# Patient Record
Sex: FEMALE | Race: BLACK OR AFRICAN AMERICAN | NOT HISPANIC OR LATINO | Employment: OTHER | URBAN - NONMETROPOLITAN AREA
[De-identification: names, ages, dates, MRNs, and addresses within clinical notes are randomized per-mention and may not be internally consistent; named-entity substitution may affect disease eponyms.]

---

## 2018-06-13 ENCOUNTER — HISTORICAL (OUTPATIENT)
Dept: ADMINISTRATIVE | Facility: HOSPITAL | Age: 83
End: 2018-06-13

## 2018-06-16 ENCOUNTER — HISTORICAL (OUTPATIENT)
Dept: ADMINISTRATIVE | Facility: HOSPITAL | Age: 83
End: 2018-06-16

## 2019-02-28 ENCOUNTER — HISTORICAL (OUTPATIENT)
Dept: ADMINISTRATIVE | Facility: HOSPITAL | Age: 84
End: 2019-02-28

## 2019-04-22 ENCOUNTER — HISTORICAL (OUTPATIENT)
Dept: ADMINISTRATIVE | Facility: HOSPITAL | Age: 84
End: 2019-04-22

## 2019-05-02 ENCOUNTER — HISTORICAL (OUTPATIENT)
Dept: ADMINISTRATIVE | Facility: HOSPITAL | Age: 84
End: 2019-05-02

## 2020-02-17 ENCOUNTER — HISTORICAL (OUTPATIENT)
Dept: ADMINISTRATIVE | Facility: HOSPITAL | Age: 85
End: 2020-02-17

## 2020-07-17 LAB
BILIRUB SERPL-MCNC: NEGATIVE MG/DL
BLOOD URINE, POC: NORMAL
CLARITY, POC UA: NORMAL
COLOR, POC UA: YELLOW
GLUCOSE UR QL STRIP: NEGATIVE
KETONES UR QL STRIP: NEGATIVE
LEUKOCYTE EST, POC UA: NEGATIVE
NITRITE, POC UA: NEGATIVE
PH, POC UA: 6
PROTEIN, POC: NEGATIVE
SPECIFIC GRAVITY, POC UA: 1.02
UROBILINOGEN, POC UA: NORMAL

## 2020-07-23 LAB
BILIRUB SERPL-MCNC: NEGATIVE MG/DL
BLOOD URINE, POC: NEGATIVE
CLARITY, POC UA: CLEAR
COLOR, POC UA: YELLOW
GLUCOSE UR QL STRIP: NEGATIVE
KETONES UR QL STRIP: NEGATIVE
LEUKOCYTE EST, POC UA: NEGATIVE
NITRITE, POC UA: NEGATIVE
PH, POC UA: 6.5
PROTEIN, POC: NEGATIVE
SPECIFIC GRAVITY, POC UA: 1
UROBILINOGEN, POC UA: NORMAL

## 2020-10-20 ENCOUNTER — HISTORICAL (OUTPATIENT)
Dept: ADMINISTRATIVE | Facility: HOSPITAL | Age: 85
End: 2020-10-20

## 2020-10-26 ENCOUNTER — HISTORICAL (OUTPATIENT)
Dept: ADMINISTRATIVE | Facility: HOSPITAL | Age: 85
End: 2020-10-26

## 2021-06-19 ENCOUNTER — HISTORICAL (OUTPATIENT)
Dept: ADMINISTRATIVE | Facility: HOSPITAL | Age: 86
End: 2021-06-19

## 2021-07-14 ENCOUNTER — HISTORICAL (OUTPATIENT)
Dept: ADMINISTRATIVE | Facility: HOSPITAL | Age: 86
End: 2021-07-14

## 2021-11-29 LAB
ALBUMIN SERPL-MCNC: 4 G/DL (ref 3.4–5)
ALBUMIN/GLOB SERPL: 1.2 {RATIO}
ALP SERPL-CCNC: 94 U/L (ref 50–144)
ALT SERPL-CCNC: 15 U/L (ref 1–45)
ANION GAP SERPL CALC-SCNC: 7 MMOL/L (ref 2–13)
AST SERPL-CCNC: 25 U/L (ref 14–36)
BASOPHILS # BLD AUTO: 0.07 10*3/UL (ref 0.01–0.08)
BASOPHILS NFR BLD AUTO: 1.4 % (ref 0.1–1.2)
BILIRUB SERPL-MCNC: 0.3 MG/DL (ref 0–1)
BUN SERPL-MCNC: 14 MG/DL (ref 7–20)
CALCIUM SERPL-MCNC: 9.3 MG/DL (ref 8.4–10.2)
CHLORIDE SERPL-SCNC: 101 MMOL/L (ref 94–110)
CO2 SERPL-SCNC: 30 MMOL/L (ref 21–32)
CREAT SERPL-MCNC: 0.81 MG/DL (ref 0.52–1.04)
CREAT/UREA NIT SERPL: 17.3 (ref 12–20)
EOSINOPHIL # BLD AUTO: 0.11 10*3/UL (ref 0.04–0.36)
EOSINOPHIL NFR BLD AUTO: 2.2 % (ref 0.7–7)
ERYTHROCYTE [DISTWIDTH] IN BLOOD BY AUTOMATED COUNT: 13.2 % (ref 11–14.5)
EST CREAT CLEARANCE SER (OHS): 62.41 ML/MIN
EST. AVERAGE GLUCOSE BLD GHB EST-MCNC: 129 MG/DL (ref 70–115)
GLOBULIN SER-MCNC: 3.3 G/DL (ref 2–3.9)
GLUCOSE SERPL-MCNC: 193 MGM./DL (ref 70–115)
HBA1C MFR BLD: 6.3 % (ref 4–6)
HCT VFR BLD AUTO: 42.1 % (ref 36–48)
HGB BLD-MCNC: 13.8 G/DL (ref 11.8–16)
IMM GRANULOCYTES # BLD AUTO: 0.01 10*3/UL (ref 0–0.03)
IMM GRANULOCYTES NFR BLD AUTO: 0.2 % (ref 0–0.5)
LYMPHOCYTES # BLD AUTO: 2.02 10*3/UL (ref 1.16–3.74)
LYMPHOCYTES NFR BLD AUTO: 40.1 % (ref 20–55)
MCH RBC QN AUTO: 29.4 PG (ref 27–34)
MCHC RBC AUTO-ENTMCNC: 32.8 G/DL (ref 31–37)
MCV RBC AUTO: 89.8 FL (ref 79–99)
MONOCYTES # BLD AUTO: 0.45 10*3/UL (ref 0.24–0.36)
MONOCYTES NFR BLD AUTO: 8.9 % (ref 4.7–12.5)
NEUTROPHILS # BLD AUTO: 2.38 10*3/UL (ref 1.56–6.13)
NEUTROPHILS NFR BLD AUTO: 47.2 % (ref 37–73)
PLATELET # BLD AUTO: 279 10*3/UL (ref 140–371)
PMV BLD AUTO: 10.9 FL (ref 9.4–12.4)
POTASSIUM SERPL-SCNC: 4.2 MMOL/L (ref 3.5–5.1)
PROT SERPL-MCNC: 7.3 G/DL (ref 6.3–8.2)
RBC # BLD AUTO: 4.69 10*6/UL (ref 4–5.1)
SODIUM SERPL-SCNC: 138 MMOL/L (ref 135–145)
TSH SERPL-ACNC: 2.44 UIU/ML (ref 0.36–3.74)
WBC # SPEC AUTO: 5 10*3/UL (ref 4–11.5)

## 2022-01-24 ENCOUNTER — HISTORICAL (OUTPATIENT)
Dept: ADMINISTRATIVE | Facility: HOSPITAL | Age: 87
End: 2022-01-24

## 2022-04-10 ENCOUNTER — HISTORICAL (OUTPATIENT)
Dept: ADMINISTRATIVE | Facility: HOSPITAL | Age: 87
End: 2022-04-10

## 2022-04-19 ENCOUNTER — HISTORICAL (OUTPATIENT)
Dept: ADMINISTRATIVE | Facility: HOSPITAL | Age: 87
End: 2022-04-19

## 2022-04-26 VITALS
BODY MASS INDEX: 33.22 KG/M2 | SYSTOLIC BLOOD PRESSURE: 132 MMHG | WEIGHT: 175.94 LBS | HEIGHT: 61 IN | DIASTOLIC BLOOD PRESSURE: 70 MMHG | OXYGEN SATURATION: 96 %

## 2022-05-02 ENCOUNTER — HISTORICAL (OUTPATIENT)
Dept: ADMINISTRATIVE | Facility: HOSPITAL | Age: 87
End: 2022-05-02

## 2022-05-03 NOTE — HISTORICAL [facility]
This is a historical note converted from Ana Laura. Formatting and pictures may have been removed.  Please reference Ana Laura for original formatting and attached multimedia. Chief Complaint  left ankle pain and swelling  History of Present Illness  She is here with continued complaints of?left ankle pain.? She describes it as a dull achy pain?in the foot ankle and lower leg.? This is been present for?several months.? She seems to think it happened after she had her left knee replacement.? She has swelling in both legs?that is stable.? She does not have any symptoms in the right?lower extremity at all.? She denies fevers or chills.? And she is otherwise been active?and feeling well.  Physical Exam  Vitals & Measurements  T:?37.1? ?C (Oral)? HR:?80(Peripheral)? BP:?138/70? SpO2:?99%?  HT:?153.00?cm? WT:?80.300?kg? BMI:?34.3?  She looks well,?mental status is at her baseline,?she is alert and pleasant  She is breathing normally  Both lower extremities have pitting edema?which is similar to?the last time I saw her  There is no redness of the lower extremities  The left medial ankle?has some?firm discolored areas?of skin?that are little more tender to touch  She does have some swelling of the foot?but less so than the?leg because she has had some compression socks  She has fairly good range of motion of the foot and ankle?for her age  She is able to walk?without any assistance  Is this discolored?tender?firm area of the left medial ankle is?a relatively new problem and she feels like?it is look like that felt like that since her?knee replacement in December 2020  Assessment/Plan  1.?Ankle pain ? M25.579  ?  She tells me that her?orthopedist sent her to a foot and ankle specialist?for this?problem.? She says that?he talked about?doing an MRI?but then she?got busy doing other things?and dealing with other issues that she has not followed up with him yet.  So we will check to see if she has an MRI compatible?knee implant. ?Send  for the notes from this foot and ankle specialist.? We will check on her insurance?issues/coverage?and see if we can do this MRI?to further evaluate?her symptoms.? She also had x-rays?done at that clinic in [place]?so we will send for those reports as well. ?She requested some medication to help with her symptoms and?said the meloxicam helped her?in the past.? I will send the prescription for that and I cautioned her to only take a few days at a time?and to make sure she is well-hydrated when she takes it.  ?  Orders:  meloxicam, See Instructions, 1 tab(s) Oral Daily for up to 5 days in a row, # 30 tab(s), 1 Refill(s), Pharmacy: [id number], 153, cm, Height/Length Dosing, 06/04/21 9:44:00 CDT, 80.3, kg, Weight Dosing, 06/04/21 9:44:00 CDT  Office/Outpatient Visit Level 3 Established [id number], [facility]le [facility], 06/04/21 10:00:00 CDT   Problem List/Past Medical History  Ongoing  Benign essential hypertension  Obesity  Historical  No qualifying data  Procedure/Surgical History  Arthroplasty, knee, condyle and plateau; medial AND lateral compartments with or without patella resurfacing (total knee arthroplasty) (12/07/2020)  Appendectomy;  Arthroscopy, knee, diagnostic, with or without synovial biopsy (separate procedure)  Total abdominal hysterectomy (corpus and cervix), with or without removal of tube(s), with or without removal of ovary(s);   Medications  acetaminophen-hydrocodone 325 mg-10 mg oral tablet, 1 tab(s), Oral, q6hr  amlodipine 5 mg oral tablet, See Instructions  meloxicam 15 mg oral tablet, See Instructions, 1 refills  Allergies  iodine?(Rash)  Social History  Abuse/Neglect  No, 06/04/2021  Tobacco  Never (less than 100 in lifetime), N/A, 06/04/2021  Family History  Breast cancer: Daughter.  Gastric cancer: Maternal Grandmother.  Immunizations  Vaccine Date Status   COVID-19 mRNA, LNP-S, PF - Moderna 03/03/2021 Recorded   COVID-19 mRNA, LNP-S, PF - Moderna 02/03/2021  Recorded   influenza virus vaccine, inactivated 11/16/2009 Recorded   influenza virus vaccine, inactivated 11/19/2007 Recorded   influenza virus vaccine, inactivated 11/28/2006 Recorded   influenza virus vaccine, inactivated 12/27/2005 Recorded   influenza virus vaccine, inactivated 11/29/2004 Recorded   influenza virus vaccine, inactivated 12/02/2002 Recorded   influenza virus vaccine, inactivated 12/17/2001 Recorded   pneumococcal 23-valent vaccine 11/01/1999 Recorded   Health Maintenance  Health Maintenance  ???Pending?(in the next year)  ??? ??OverDue  ??? ? ? ?Hypertension Management-BMP due??05/06/20??and every 1??year(s)  ??? ? ? ?Influenza Vaccine due??10/01/20??and every 1??day(s)  ??? ? ? ?Advance Directive due??01/02/21??and every 1??year(s)  ??? ? ? ?Cognitive Screening due??01/02/21??and every 1??year(s)  ??? ? ? ?Functional Assessment due??01/02/21??and every 1??year(s)  ??? ??Due?  ??? ? ? ?ADL Screening due??06/04/21??and every 1??year(s)  ??? ? ? ?Bone Density Screening due??06/04/21??Variable frequency  ??? ? ? ?Depression Screening due??06/04/21??Unknown Frequency  ??? ? ? ?Hypertension Management-Education due??06/04/21??and every 1??year(s)  ??? ? ? ?Lipid Screening due??06/04/21??Unknown Frequency  ??? ? ? ?Medicare Annual Wellness Exam due??06/04/21??and every 1??year(s)  ??? ? ? ?Tetanus Vaccine due??06/04/21??and every 10??year(s)  ??? ? ? ?Zoster Vaccine due??06/04/21??Unknown Frequency  ??? ??Due In Future?  ??? ? ? ?Obesity Screening not due until??01/01/22??and every 1??year(s)  ??? ? ? ?Fall Risk Assessment not due until??01/02/22??and every 1??year(s)  ??? ? ? ?Diabetes Screening not due until??05/06/22??and every 3??year(s)  ???Satisfied?(in the past 1 year)  ??? ??Satisfied?  ??? ? ? ?Blood Pressure Screening on??06/04/21.??Satisfied by Nerissa Vazquez  ??? ? ? ?Body Mass Index Check on??06/04/21.??Satisfied by Nerissa Vazquez  ??? ? ? ?Fall Risk Assessment on??06/04/21.??Satisfied by  Nerissa Vazquez  ??? ? ? ?Hypertension Management-Blood Pressure on??06/04/21.??Satisfied by Nerissa Vazquez  ??? ? ? ?Influenza Vaccine on??01/11/21.??Satisfied by Nerissa Vazquez  ??? ? ? ?Obesity Screening on??06/04/21.??Satisfied by Nerissa Vazquez  ?

## 2022-09-18 ENCOUNTER — HISTORICAL (OUTPATIENT)
Dept: ADMINISTRATIVE | Facility: HOSPITAL | Age: 87
End: 2022-09-18

## 2022-12-01 ENCOUNTER — CLINICAL SUPPORT (OUTPATIENT)
Dept: RESPIRATORY THERAPY | Facility: HOSPITAL | Age: 87
End: 2022-12-01
Attending: ANESTHESIOLOGY
Payer: MEDICARE

## 2022-12-01 DIAGNOSIS — Z12.11 SCREEN FOR COLON CANCER: ICD-10-CM

## 2022-12-01 DIAGNOSIS — Z12.11 SCREEN FOR COLON CANCER: Primary | ICD-10-CM

## 2022-12-01 PROCEDURE — 93005 ELECTROCARDIOGRAM TRACING: CPT

## 2022-12-01 RX ORDER — AMLODIPINE BESYLATE 5 MG/1
5 TABLET ORAL EVERY MORNING
COMMUNITY
Start: 2022-11-17 | End: 2023-08-24

## 2022-12-01 NOTE — DISCHARGE INSTRUCTIONS
Follow prep on Sunday. Nothing to drink after midnight. Take Amlodipine AM of procedure with small sip of water.

## 2022-12-02 ENCOUNTER — ANESTHESIA EVENT (OUTPATIENT)
Dept: SURGERY | Facility: HOSPITAL | Age: 87
End: 2022-12-02
Payer: MEDICARE

## 2022-12-02 NOTE — ANESTHESIA PREPROCEDURE EVALUATION
12/02/2022  Pinky West is a 88 y.o., female.      Pre-op Assessment    I have reviewed the Patient Summary Reports.     I have reviewed the Nursing Notes. I have reviewed the NPO Status.   I have reviewed the Medications.     Review of Systems         Anesthesia Plan  Type of Anesthesia, risks & benefits discussed:    Anesthesia Type: Gen Natural Airway  ASA Score: 3    Ready For Surgery From Anesthesia Perspective.     .

## 2022-12-05 ENCOUNTER — HOSPITAL ENCOUNTER (OUTPATIENT)
Facility: HOSPITAL | Age: 87
Discharge: HOME | End: 2022-12-05
Attending: SURGERY | Admitting: SURGERY
Payer: MEDICARE

## 2022-12-05 ENCOUNTER — ANESTHESIA (OUTPATIENT)
Dept: SURGERY | Facility: HOSPITAL | Age: 87
End: 2022-12-05
Payer: MEDICARE

## 2022-12-05 VITALS
OXYGEN SATURATION: 100 % | WEIGHT: 172 LBS | DIASTOLIC BLOOD PRESSURE: 74 MMHG | TEMPERATURE: 97 F | SYSTOLIC BLOOD PRESSURE: 152 MMHG | BODY MASS INDEX: 32.06 KG/M2 | RESPIRATION RATE: 18 BRPM | HEART RATE: 68 BPM

## 2022-12-05 DIAGNOSIS — Z12.11 SCREEN FOR COLON CANCER: ICD-10-CM

## 2022-12-05 DIAGNOSIS — Z12.11 SCREENING FOR COLON CANCER: ICD-10-CM

## 2022-12-05 PROCEDURE — 45385 COLONOSCOPY W/LESION REMOVAL: CPT | Performed by: SURGERY

## 2022-12-05 PROCEDURE — 25000003 PHARM REV CODE 250: Performed by: NURSE ANESTHETIST, CERTIFIED REGISTERED

## 2022-12-05 PROCEDURE — 37000008 HC ANESTHESIA 1ST 15 MINUTES: Performed by: SURGERY

## 2022-12-05 PROCEDURE — 88305 TISSUE EXAM BY PATHOLOGIST: CPT | Performed by: SURGERY

## 2022-12-05 PROCEDURE — 63600175 PHARM REV CODE 636 W HCPCS: Performed by: NURSE ANESTHETIST, CERTIFIED REGISTERED

## 2022-12-05 PROCEDURE — 37000009 HC ANESTHESIA EA ADD 15 MINS: Performed by: SURGERY

## 2022-12-05 PROCEDURE — C1773 RET DEV, INSERTABLE: HCPCS | Performed by: SURGERY

## 2022-12-05 PROCEDURE — 63600175 PHARM REV CODE 636 W HCPCS: Performed by: ANESTHESIOLOGY

## 2022-12-05 RX ORDER — LIDOCAINE HYDROCHLORIDE 10 MG/ML
1 INJECTION, SOLUTION EPIDURAL; INFILTRATION; INTRACAUDAL; PERINEURAL ONCE
Status: DISCONTINUED | OUTPATIENT
Start: 2022-12-05 | End: 2022-12-05 | Stop reason: HOSPADM

## 2022-12-05 RX ORDER — FENTANYL CITRATE 50 UG/ML
INJECTION, SOLUTION INTRAMUSCULAR; INTRAVENOUS
Status: DISCONTINUED | OUTPATIENT
Start: 2022-12-05 | End: 2022-12-05

## 2022-12-05 RX ORDER — SODIUM CHLORIDE, SODIUM LACTATE, POTASSIUM CHLORIDE, CALCIUM CHLORIDE 600; 310; 30; 20 MG/100ML; MG/100ML; MG/100ML; MG/100ML
INJECTION, SOLUTION INTRAVENOUS CONTINUOUS
Status: DISCONTINUED | OUTPATIENT
Start: 2022-12-05 | End: 2022-12-05 | Stop reason: HOSPADM

## 2022-12-05 RX ORDER — LIDOCAINE HYDROCHLORIDE 20 MG/ML
INJECTION INTRAVENOUS
Status: DISCONTINUED | OUTPATIENT
Start: 2022-12-05 | End: 2022-12-05

## 2022-12-05 RX ORDER — PROPOFOL 10 MG/ML
VIAL (ML) INTRAVENOUS
Status: DISCONTINUED | OUTPATIENT
Start: 2022-12-05 | End: 2022-12-05

## 2022-12-05 RX ADMIN — FENTANYL CITRATE 50 MCG: 50 INJECTION, SOLUTION INTRAMUSCULAR; INTRAVENOUS at 08:12

## 2022-12-05 RX ADMIN — PROPOFOL 50 MG: 10 INJECTION, EMULSION INTRAVENOUS at 08:12

## 2022-12-05 RX ADMIN — FENTANYL CITRATE 25 MCG: 50 INJECTION, SOLUTION INTRAMUSCULAR; INTRAVENOUS at 08:12

## 2022-12-05 RX ADMIN — PROPOFOL 100 MG: 10 INJECTION, EMULSION INTRAVENOUS at 08:12

## 2022-12-05 RX ADMIN — LIDOCAINE HYDROCHLORIDE 60 MG: 20 INJECTION, SOLUTION INTRAVENOUS at 08:12

## 2022-12-05 RX ADMIN — SODIUM CHLORIDE, POTASSIUM CHLORIDE, SODIUM LACTATE AND CALCIUM CHLORIDE: 600; 310; 30; 20 INJECTION, SOLUTION INTRAVENOUS at 07:12

## 2022-12-05 NOTE — ANESTHESIA POSTPROCEDURE EVALUATION
Anesthesia Post Evaluation    Patient: Pinky West    Procedure(s) Performed: Procedure(s) (LRB):  COLONOSCOPY (N/A)  COLONOSCOPY, WITH POLYPECTOMY USING SNARE (N/A)  POLYPECTOMY (N/A)    Final Anesthesia Type: general      Patient participation: Yes- Able to Participate  Level of consciousness: awake and alert  Post-procedure vital signs: reviewed and stable  Pain management: adequate  Airway patency: patent    PONV status at discharge: No PONV  Anesthetic complications: no      Cardiovascular status: blood pressure returned to baseline  Respiratory status: unassisted  Hydration status: euvolemic  Follow-up not needed.          Vitals Value Taken Time   /79 12/05/22 0659   Temp 36 °C (96.8 °F) 12/05/22 0659   Pulse 77 12/05/22 0659   Resp 20 12/05/22 0659   SpO2 98 % 12/05/22 0659         No case tracking events are documented in the log.      Pain/Zonia Score: No data recorded

## 2022-12-05 NOTE — OP NOTE
[facility]                     [address] [address]    PATIENT NAME:      JAIME BAKER   YOB: 1934  CSN:               [id number]  MRN:               [id number]  ADMIT DATE:        12/05/2022 06:49:00  PHYSICIAN:         Alfreda Hayes MD                          OPERATIVE REPORT      DATE OF SURGERY:    12/05/2022 00:00:00    SURGEON:  Alfreda Hayes MD    PREOPERATIVE DIAGNOSES:    1. Change of bowel habits with recent constipation noted.    2. History of perforated small bowel recently.   3. Hypertension.   4. Dizziness.    POSTOPERATIVE DIAGNOSES:    1. Change of bowel habits with recent constipation noted.    2. History of perforated small bowel recently.   3. Hypertension.   4. Dizziness.    PROCEDURES:    1. Colonoscopy with intubation of ileocecal valve.    2. Polypectomy.    ASSIST:  OR staff.    COMPLICATIONS:  None.    FINDINGS:    1. Digital rectal exam with grade 3 prolapsed internal hemorrhoids without   complication.  2. Prep good to fair more proximally with some thicker stool lining the walls   along the cecum and ascending colon.  3. Appendicolith in the appendiceal orifice.  Ileocecal valve normal-appearing   with intubation of the terminal ileum for about 5 cm.  4. Normal ascending colon with a 4 mm sessile polyp retrieved using hot snare.   5. Normal transverse and descending colon with 2 mm polyp at 50 cm, retrieved   with cold forceps.    6. Normal sigmoid and rectum with retroflexion demonstrating hemorrhoidal   bundles in appropriate position.    OPERATIVE REPORT:   Patient was brought to the endoscopy suite and placed in   left lateral decubitus position.  Digital rectal exam was performed with   findings as noted above.  The scope was advanced through the anorectum into the   colon, up to the cecum.  The appendiceal orifice and ileocecal valve were both   identified.  The  appendiceal orifice had an appendicolith at the base.    Otherwise, the ileocecal valve was normal-appearing.  Intubation of the terminal   ileum demonstrated normal terminal ileum for about 5 cm.  The cecum was   otherwise normal.  The ascending colon had a 4 mm polyp that was sessile in   nature.  This was retrieved with a hot snare.  The transverse colon was normal.    The descending colon had a 2 mm polyp that was sessile in nature.  This was   retrieved with cold forceps.  Otherwise, the sigmoid colon and rectum were both   normal.  Retroflexion demonstrated hemorrhoidal bundles in appropriate position.    The scope was retrieved.  She tolerated the procedure well without   complication.        ______________________________  MD JOHNNA Felix/AQS  DD:  12/05/2022  Time:  08:45AM  DT:  12/05/2022  Time:  02:33PM  Job #:  [id number]      OPERATIVE REPORT

## 2022-12-07 LAB
DHEA SERPL-MCNC: NORMAL
ESTROGEN SERPL-MCNC: NORMAL PG/ML
INSULIN SERPL-ACNC: NORMAL U[IU]/ML
LAB AP CLINICAL INFORMATION: NORMAL
LAB AP GROSS DESCRIPTION: NORMAL
LAB AP REPORT FOOTNOTES: NORMAL
T3RU NFR SERPL: NORMAL %

## 2022-12-19 NOTE — DISCHARGE SUMMARY
[facility]  Discharge Note  Short Stay    Procedure(s) (LRB):  COLONOSCOPY (N/A)  COLONOSCOPY, WITH POLYPECTOMY USING SNARE (N/A)  POLYPECTOMY (N/A)      OUTCOME: Patient tolerated treatment/procedure well without complication and is now ready for discharge.    DISPOSITION: Home or Self Care    FINAL DIAGNOSIS:  <principal problem not specified>    FOLLOWUP: In clinic    DISCHARGE INSTRUCTIONS:  No discharge procedures on file.      Clinical Reference Documents Added to Patient Instructions         Document    COLON POLYPECTOMY DISCHARGE INSTRUCTIONS (ENGLISH)            TIME SPENT ON DISCHARGE: 3 minutes

## 2023-01-11 DIAGNOSIS — K63.1 PERFORATION OF INTESTINE: Primary | ICD-10-CM

## 2023-01-23 ENCOUNTER — HOSPITAL ENCOUNTER (OUTPATIENT)
Dept: RADIOLOGY | Facility: HOSPITAL | Age: 88
Discharge: HOME | End: 2023-01-23
Attending: SURGERY
Payer: MEDICARE

## 2023-01-23 DIAGNOSIS — K63.1 PERFORATION OF INTESTINE: ICD-10-CM

## 2023-01-23 PROCEDURE — 74176 CT ABD & PELVIS W/O CONTRAST: CPT | Mod: TC

## 2023-01-31 ENCOUNTER — TELEPHONE (OUTPATIENT)
Dept: FAMILY MEDICINE | Facility: CLINIC | Age: 88
End: 2023-01-31
Payer: MEDICARE

## 2023-01-31 NOTE — TELEPHONE ENCOUNTER
----- Message from Bozena Burgos sent at 1/31/2023 11:49 AM CST -----  Regarding: call back  Patient is requesting an appointment. She states that she is not sure if Dr. Sanchez knows what is going on with her. Please call [phone]

## 2023-01-31 NOTE — TELEPHONE ENCOUNTER
Pt was referred to Dr. Ramirez for colonoscopy at her last visit with you.  She said Dr. Ramirez referred her to his sister.  She had scope then they ordered a CT.  Both results are in the system.  She said she wanted you to know what was going on because she didn't think you did.  She has not gotten results of any of it from Dr. Hayes so she asked if you could tell her if she needs to do anything from here.

## 2023-02-01 NOTE — TELEPHONE ENCOUNTER
Let her know the colonoscopy showed one small polyp that was benign and the CT scan did not show any problem

## 2023-04-17 ENCOUNTER — TELEPHONE (OUTPATIENT)
Dept: FAMILY MEDICINE | Facility: CLINIC | Age: 88
End: 2023-04-17
Payer: MEDICARE

## 2023-04-17 NOTE — TELEPHONE ENCOUNTER
----- Message from Beti Walker sent at 4/17/2023  4:09 PM CDT -----  Regarding: call back  Pt. Called and stated that her allergies are really bad, pt. Is requesting for something to be called out.    [phone]

## 2023-04-17 NOTE — TELEPHONE ENCOUNTER
Spoke to pt, she recently had an allergic reaction to iodine and broke out in a rash.  She said that she went to Urgent care and they gave her steroids.  She wanted to know if there is something she can take instead of benadryl.  She will try claritin daily and let us know if it is not helping.

## 2023-07-25 ENCOUNTER — TELEPHONE (OUTPATIENT)
Dept: FAMILY MEDICINE | Facility: CLINIC | Age: 88
End: 2023-07-25
Payer: MEDICARE

## 2023-07-26 ENCOUNTER — OFFICE VISIT (OUTPATIENT)
Dept: FAMILY MEDICINE | Facility: CLINIC | Age: 88
End: 2023-07-26
Payer: MEDICARE

## 2023-07-26 VITALS
HEIGHT: 61 IN | BODY MASS INDEX: 33.73 KG/M2 | OXYGEN SATURATION: 97 % | WEIGHT: 178.63 LBS | SYSTOLIC BLOOD PRESSURE: 144 MMHG | TEMPERATURE: 98 F | HEART RATE: 107 BPM | DIASTOLIC BLOOD PRESSURE: 72 MMHG

## 2023-07-26 DIAGNOSIS — B37.9 CANDIDIASIS: Primary | ICD-10-CM

## 2023-07-26 PROCEDURE — 99212 OFFICE O/P EST SF 10 MIN: CPT | Mod: ,,, | Performed by: PEDIATRICS

## 2023-07-26 PROCEDURE — 99212 PR OFFICE/OUTPT VISIT, EST, LEVL II, 10-19 MIN: ICD-10-PCS | Mod: ,,, | Performed by: PEDIATRICS

## 2023-07-26 RX ORDER — FLUCONAZOLE 100 MG/1
100 TABLET ORAL DAILY
Qty: 10 TABLET | Refills: 0 | Status: SHIPPED | OUTPATIENT
Start: 2023-07-26 | End: 2023-08-05

## 2023-08-23 DIAGNOSIS — I10 PRIMARY HYPERTENSION: Primary | ICD-10-CM

## 2023-08-24 RX ORDER — AMLODIPINE BESYLATE 5 MG/1
5 TABLET ORAL
Qty: 90 TABLET | Refills: 0 | Status: SHIPPED | OUTPATIENT
Start: 2023-08-24 | End: 2023-11-15

## 2023-08-28 ENCOUNTER — TELEPHONE (OUTPATIENT)
Dept: FAMILY MEDICINE | Facility: CLINIC | Age: 88
End: 2023-08-28
Payer: MEDICARE

## 2023-08-28 NOTE — TELEPHONE ENCOUNTER
"Pt states that she tested + for COVID Saturday. She was told to contact PCP.       Denies any trouble breathing. Just feels terrible. Denies fever.       Denies any other covid symptoms besides "feeling terrible".     Please advise.     [phone]  "
Left detailed message for pt  
Several members of her [employer] had covid.  She took a test Saturday and it was positive.  She feels okay for the most part, just drowsy and light headache.  She said she doesn't feel bad- but doesn't feel good.  She said she is worried about the virus affecting her.  I told her to take tylenol/motrin as needed and drink extra fluids.    Sulfa- Iodine  
No

## 2023-10-26 ENCOUNTER — TELEPHONE (OUTPATIENT)
Dept: FAMILY MEDICINE | Facility: CLINIC | Age: 88
End: 2023-10-26
Payer: MEDICARE

## 2023-10-26 DIAGNOSIS — K92.1 BLACK STOOLS: Primary | ICD-10-CM

## 2023-10-26 PROCEDURE — 85025 COMPLETE CBC W/AUTO DIFF WBC: CPT | Performed by: PEDIATRICS

## 2023-10-26 RX ORDER — OMEPRAZOLE 20 MG/1
20 CAPSULE, DELAYED RELEASE ORAL DAILY
Qty: 30 CAPSULE | Refills: 0 | Status: SHIPPED | OUTPATIENT
Start: 2023-10-26 | End: 2024-02-27 | Stop reason: SDUPTHER

## 2023-10-26 NOTE — TELEPHONE ENCOUNTER
Pt stated on Saturday she had started having abdominal pain.  She took a dose of pepto.  Sunday she had loose black stools.  She has continued to have abdominal discomfort and darker stools- they are not as black as Sunday.  She said her stomach feels sick, no nausea or vomiting.  Dr. Sanchez said he wants her to avoid NSAIDS, take an acid blocker for one month and to come in for CBC and Occult stool.

## 2023-10-27 ENCOUNTER — TELEPHONE (OUTPATIENT)
Dept: FAMILY MEDICINE | Facility: CLINIC | Age: 88
End: 2023-10-27
Payer: MEDICARE

## 2023-11-01 ENCOUNTER — TELEPHONE (OUTPATIENT)
Dept: FAMILY MEDICINE | Facility: CLINIC | Age: 88
End: 2023-11-01
Payer: MEDICARE

## 2023-11-01 NOTE — TELEPHONE ENCOUNTER
There was an error with her occult stool cards.  They were activated prior to her dropping them off.  She is no longer having abdominal pain or discolored stools.  Dr. Sanchez said since her symptoms have resolved she does not need to recollect.  If she starts having symptoms again she will let us know.

## 2023-11-15 DIAGNOSIS — I10 PRIMARY HYPERTENSION: ICD-10-CM

## 2023-11-15 RX ORDER — AMLODIPINE BESYLATE 5 MG/1
5 TABLET ORAL
Qty: 90 TABLET | Refills: 0 | Status: SHIPPED | OUTPATIENT
Start: 2023-11-15

## 2024-01-06 ENCOUNTER — HOSPITAL ENCOUNTER (EMERGENCY)
Facility: HOSPITAL | Age: 89
Discharge: HOME | End: 2024-01-06
Attending: FAMILY MEDICINE
Payer: MEDICARE

## 2024-01-06 DIAGNOSIS — R53.83 MALAISE AND FATIGUE: Primary | ICD-10-CM

## 2024-01-06 DIAGNOSIS — R53.81 MALAISE AND FATIGUE: Primary | ICD-10-CM

## 2024-01-06 DIAGNOSIS — I10 HYPERTENSION, UNSPECIFIED TYPE: ICD-10-CM

## 2024-01-06 DIAGNOSIS — R53.1 WEAK: ICD-10-CM

## 2024-01-06 LAB
ALBUMIN SERPL-MCNC: 3.8 G/DL (ref 3.4–5)
ALBUMIN SERPL-MCNC: 4 G/DL (ref 3.4–5)
ALBUMIN/GLOB SERPL: 1.2 RATIO
ALBUMIN/GLOB SERPL: 1.2 RATIO
ALP SERPL-CCNC: 86 UNIT/L (ref 50–144)
ALP SERPL-CCNC: 94 UNIT/L (ref 50–144)
ALT SERPL-CCNC: 16 UNIT/L (ref 1–45)
ALT SERPL-CCNC: 17 UNIT/L (ref 1–45)
ANION GAP SERPL CALC-SCNC: 4 MEQ/L (ref 2–13)
ANION GAP SERPL CALC-SCNC: 5 MEQ/L (ref 2–13)
APPEARANCE UR: CLEAR
AST SERPL-CCNC: 28 UNIT/L (ref 14–36)
AST SERPL-CCNC: 30 UNIT/L (ref 14–36)
BASOPHILS # BLD AUTO: 0.04 X10(3)/MCL (ref 0.01–0.08)
BASOPHILS # BLD AUTO: 0.04 X10(3)/MCL (ref 0.01–0.08)
BASOPHILS NFR BLD AUTO: 0.6 % (ref 0.1–1.2)
BASOPHILS NFR BLD AUTO: 1 % (ref 0.1–1.2)
BILIRUB SERPL-MCNC: 0.4 MG/DL (ref 0–1)
BILIRUB SERPL-MCNC: 0.4 MG/DL (ref 0–1)
BILIRUB UR QL STRIP.AUTO: NEGATIVE
BUN SERPL-MCNC: 9 MG/DL (ref 7–20)
BUN SERPL-MCNC: 9 MG/DL (ref 7–20)
CALCIUM SERPL-MCNC: 8.6 MG/DL (ref 8.4–10.2)
CALCIUM SERPL-MCNC: 8.9 MG/DL (ref 8.4–10.2)
CHLORIDE SERPL-SCNC: 103 MMOL/L (ref 98–110)
CHLORIDE SERPL-SCNC: 105 MMOL/L (ref 98–110)
CK MB SERPL-MCNC: 0.69 NG/ML (ref 0–3.38)
CK SERPL-CCNC: 88 U/L (ref 30–135)
CO2 SERPL-SCNC: 28 MMOL/L (ref 21–32)
CO2 SERPL-SCNC: 29 MMOL/L (ref 21–32)
COLOR UR AUTO: YELLOW
CREAT SERPL-MCNC: 0.61 MG/DL (ref 0.66–1.25)
CREAT SERPL-MCNC: 0.64 MG/DL (ref 0.66–1.25)
CREAT/UREA NIT SERPL: 14 (ref 12–20)
CREAT/UREA NIT SERPL: 15 (ref 12–20)
EOSINOPHIL # BLD AUTO: 0.02 X10(3)/MCL (ref 0.04–0.36)
EOSINOPHIL # BLD AUTO: 0.08 X10(3)/MCL (ref 0.04–0.36)
EOSINOPHIL NFR BLD AUTO: 0.3 % (ref 0.7–7)
EOSINOPHIL NFR BLD AUTO: 2 % (ref 0.7–7)
ERYTHROCYTE [DISTWIDTH] IN BLOOD BY AUTOMATED COUNT: 13.2 % (ref 11–14.5)
ERYTHROCYTE [DISTWIDTH] IN BLOOD BY AUTOMATED COUNT: 13.4 % (ref 11–14.5)
GFR SERPLBLD CREATININE-BSD FMLA CKD-EPI: 85 MLS/MIN/1.73/M2
GFR SERPLBLD CREATININE-BSD FMLA CKD-EPI: 86 MLS/MIN/1.73/M2
GLOBULIN SER-MCNC: 3.1 GM/DL (ref 2–3.9)
GLOBULIN SER-MCNC: 3.3 GM/DL (ref 2–3.9)
GLUCOSE SERPL-MCNC: 131 MG/DL (ref 70–115)
GLUCOSE SERPL-MCNC: 139 MG/DL (ref 70–115)
GLUCOSE UR QL STRIP.AUTO: NEGATIVE
HCT VFR BLD AUTO: 38.5 % (ref 36–48)
HCT VFR BLD AUTO: 43.7 % (ref 36–48)
HGB BLD-MCNC: 13.1 G/DL (ref 11.8–16)
HGB BLD-MCNC: 14.8 G/DL (ref 11.8–16)
IMM GRANULOCYTES # BLD AUTO: 0 X10(3)/MCL (ref 0–0.03)
IMM GRANULOCYTES # BLD AUTO: 0.01 X10(3)/MCL (ref 0–0.03)
IMM GRANULOCYTES NFR BLD AUTO: 0 % (ref 0–0.5)
IMM GRANULOCYTES NFR BLD AUTO: 0.1 % (ref 0–0.5)
KETONES UR QL STRIP.AUTO: NEGATIVE
LACTATE SERPL-SCNC: 1.3 MMOL/L (ref 0.4–2)
LEUKOCYTE ESTERASE UR QL STRIP.AUTO: NEGATIVE
LYMPHOCYTES # BLD AUTO: 1.57 X10(3)/MCL (ref 1.16–3.74)
LYMPHOCYTES # BLD AUTO: 1.86 X10(3)/MCL (ref 1.16–3.74)
LYMPHOCYTES NFR BLD AUTO: 22.6 % (ref 20–55)
LYMPHOCYTES NFR BLD AUTO: 45.6 % (ref 20–55)
MAGNESIUM SERPL-MCNC: 2.1 MG/DL (ref 1.8–2.4)
MCH RBC QN AUTO: 30.2 PG (ref 27–34)
MCH RBC QN AUTO: 30.3 PG (ref 27–34)
MCHC RBC AUTO-ENTMCNC: 33.9 G/DL (ref 31–37)
MCHC RBC AUTO-ENTMCNC: 34 G/DL (ref 31–37)
MCV RBC AUTO: 88.7 FL (ref 79–99)
MCV RBC AUTO: 89.4 FL (ref 79–99)
MONOCYTES # BLD AUTO: 0.38 X10(3)/MCL (ref 0.24–0.36)
MONOCYTES # BLD AUTO: 0.51 X10(3)/MCL (ref 0.24–0.36)
MONOCYTES NFR BLD AUTO: 7.3 % (ref 4.7–12.5)
MONOCYTES NFR BLD AUTO: 9.3 % (ref 4.7–12.5)
NEUTROPHILS # BLD AUTO: 1.72 X10(3)/MCL (ref 1.56–6.13)
NEUTROPHILS # BLD AUTO: 4.8 X10(3)/MCL (ref 1.56–6.13)
NEUTROPHILS NFR BLD AUTO: 42.1 % (ref 37–73)
NEUTROPHILS NFR BLD AUTO: 69.1 % (ref 37–73)
NITRITE UR QL STRIP.AUTO: NEGATIVE
NRBC BLD AUTO-RTO: 0 %
NRBC BLD AUTO-RTO: 0 %
PH UR STRIP.AUTO: 7.5 [PH]
PLATELET # BLD AUTO: 233 X10(3)/MCL (ref 140–371)
PLATELET # BLD AUTO: 249 X10(3)/MCL (ref 140–371)
PMV BLD AUTO: 9.7 FL (ref 9.4–12.4)
PMV BLD AUTO: 9.8 FL (ref 9.4–12.4)
POTASSIUM SERPL-SCNC: 3.9 MMOL/L (ref 3.5–5.1)
POTASSIUM SERPL-SCNC: 4.2 MMOL/L (ref 3.5–5.1)
PROT SERPL-MCNC: 6.9 GM/DL (ref 6.3–8.2)
PROT SERPL-MCNC: 7.3 GM/DL (ref 6.3–8.2)
PROT UR QL STRIP.AUTO: NEGATIVE
RBC # BLD AUTO: 4.34 X10(6)/MCL (ref 4–5.1)
RBC # BLD AUTO: 4.89 X10(6)/MCL (ref 4–5.1)
RBC UR QL AUTO: NEGATIVE
SODIUM SERPL-SCNC: 137 MMOL/L (ref 135–145)
SODIUM SERPL-SCNC: 137 MMOL/L (ref 135–145)
SP GR UR STRIP.AUTO: 1.01 (ref 1–1.03)
TROPONIN I SERPL-MCNC: <0.012 NG/ML (ref 0–0.03)
TROPONIN I SERPL-MCNC: <0.012 NG/ML (ref 0–0.03)
TSH SERPL-ACNC: 1.81 UIU/ML (ref 0.36–3.74)
UROBILINOGEN UR STRIP-ACNC: 0.2
WBC # SPEC AUTO: 4.08 X10(3)/MCL (ref 4–11.5)
WBC # SPEC AUTO: 6.95 X10(3)/MCL (ref 4–11.5)

## 2024-01-06 PROCEDURE — 96374 THER/PROPH/DIAG INJ IV PUSH: CPT

## 2024-01-06 PROCEDURE — 84484 ASSAY OF TROPONIN QUANT: CPT | Performed by: FAMILY MEDICINE

## 2024-01-06 PROCEDURE — 80053 COMPREHEN METABOLIC PANEL: CPT | Performed by: FAMILY MEDICINE

## 2024-01-06 PROCEDURE — 96375 TX/PRO/DX INJ NEW DRUG ADDON: CPT

## 2024-01-06 PROCEDURE — 82553 CREATINE MB FRACTION: CPT | Performed by: FAMILY MEDICINE

## 2024-01-06 PROCEDURE — 83735 ASSAY OF MAGNESIUM: CPT

## 2024-01-06 PROCEDURE — 93005 ELECTROCARDIOGRAM TRACING: CPT

## 2024-01-06 PROCEDURE — 63600175 PHARM REV CODE 636 W HCPCS

## 2024-01-06 PROCEDURE — 85025 COMPLETE CBC W/AUTO DIFF WBC: CPT

## 2024-01-06 PROCEDURE — 81003 URINALYSIS AUTO W/O SCOPE: CPT

## 2024-01-06 PROCEDURE — 85025 COMPLETE CBC W/AUTO DIFF WBC: CPT | Performed by: FAMILY MEDICINE

## 2024-01-06 PROCEDURE — 63600175 PHARM REV CODE 636 W HCPCS: Performed by: FAMILY MEDICINE

## 2024-01-06 PROCEDURE — 80053 COMPREHEN METABOLIC PANEL: CPT

## 2024-01-06 PROCEDURE — 25000003 PHARM REV CODE 250: Performed by: FAMILY MEDICINE

## 2024-01-06 PROCEDURE — 83605 ASSAY OF LACTIC ACID: CPT

## 2024-01-06 PROCEDURE — 96361 HYDRATE IV INFUSION ADD-ON: CPT

## 2024-01-06 PROCEDURE — 84484 ASSAY OF TROPONIN QUANT: CPT

## 2024-01-06 PROCEDURE — 82550 ASSAY OF CK (CPK): CPT | Performed by: FAMILY MEDICINE

## 2024-01-06 PROCEDURE — 25000003 PHARM REV CODE 250

## 2024-01-06 PROCEDURE — 93010 ELECTROCARDIOGRAM REPORT: CPT | Mod: ,,, | Performed by: INTERNAL MEDICINE

## 2024-01-06 PROCEDURE — 99284 EMERGENCY DEPT VISIT MOD MDM: CPT

## 2024-01-06 PROCEDURE — 84443 ASSAY THYROID STIM HORMONE: CPT

## 2024-01-06 RX ORDER — ONDANSETRON 4 MG/1
4 TABLET, ORALLY DISINTEGRATING ORAL
Status: COMPLETED | OUTPATIENT
Start: 2024-01-06 | End: 2024-01-06

## 2024-01-06 RX ORDER — CLONIDINE HYDROCHLORIDE 0.1 MG/1
0.1 TABLET ORAL
Status: COMPLETED | OUTPATIENT
Start: 2024-01-06 | End: 2024-01-06

## 2024-01-06 RX ORDER — SODIUM CHLORIDE 9 MG/ML
1000 INJECTION, SOLUTION INTRAVENOUS
Status: DISCONTINUED | OUTPATIENT
Start: 2024-01-06 | End: 2024-01-07 | Stop reason: HOSPADM

## 2024-01-06 RX ORDER — LORAZEPAM 2 MG/ML
0.5 INJECTION INTRAMUSCULAR
Status: COMPLETED | OUTPATIENT
Start: 2024-01-06 | End: 2024-01-06

## 2024-01-06 RX ORDER — HYDRALAZINE HYDROCHLORIDE 20 MG/ML
20 INJECTION INTRAMUSCULAR; INTRAVENOUS
Status: COMPLETED | OUTPATIENT
Start: 2024-01-06 | End: 2024-01-06

## 2024-01-06 RX ADMIN — CLONIDINE HYDROCHLORIDE 0.1 MG: 0.1 TABLET ORAL at 04:01

## 2024-01-06 RX ADMIN — HYDRALAZINE HYDROCHLORIDE 20 MG: 20 INJECTION INTRAMUSCULAR; INTRAVENOUS at 04:01

## 2024-01-06 RX ADMIN — LORAZEPAM 0.5 MG: 2 INJECTION INTRAMUSCULAR; INTRAVENOUS at 07:01

## 2024-01-06 RX ADMIN — SODIUM CHLORIDE 500 ML: 9 INJECTION, SOLUTION INTRAVENOUS at 05:01

## 2024-01-06 RX ADMIN — ONDANSETRON 4 MG: 4 TABLET, ORALLY DISINTEGRATING ORAL at 05:01

## 2024-01-06 NOTE — ED PROVIDER NOTES
"Encounter Date: 1/6/2024       History     Chief Complaint   Patient presents with    Dizziness    Weakness    Hypertension     C/o dizziness, weakness, hypertension, pt reports taking amlodipene 5mg today but had missed a couple days     Patient presents to the emergency room being dizzy and weak today.  She has a long history of hypertension and is poorly compliant.  She denies any chest pains or difficulty breathing, mostly she feels tired.  She had not taken her amlodipine in "awhile", so did take 5 mg amlodipine before coming to the emergency room.    The history is provided by the patient and a relative.     Review of patient's allergies indicates:   Allergen Reactions    Sucralfate Hives    Iodine Hives and Rash     Past Medical History:   Diagnosis Date    Blood transfusion declined because patient is [employer]     HTN (hypertension)      Past Surgical History:   Procedure Laterality Date    APPENDECTOMY      COLONOSCOPY N/A 12/5/2022    Procedure: COLONOSCOPY;  Surgeon: BRAXTON Ruby MD;  Location: [facility];  Service: Endoscopy;  Laterality: N/A;    HYSTERECTOMY      KNEE SURGERY Left      Family History   Problem Relation Age of Onset    No Known Problems Mother     No Known Problems Father      Social History     Tobacco Use    Smoking status: Never    Smokeless tobacco: Never   Substance Use Topics    Alcohol use: Not Currently    Drug use: Never     Review of Systems   Constitutional:  Positive for fatigue. Negative for fever.   HENT:  Negative for sore throat.    Respiratory:  Negative for shortness of breath.    Cardiovascular:  Negative for chest pain.   Gastrointestinal:  Negative for nausea.   Genitourinary:  Negative for dysuria.   Musculoskeletal:  Negative for back pain.   Skin:  Negative for rash.   Neurological:  Negative for weakness.   Hematological:  Does not bruise/bleed easily.   All other systems reviewed and are negative.      Physical Exam     Initial Vitals " [01/06/24 1550]   BP Pulse Resp Temp SpO2   (!) 194/106 77 20 98 °F (36.7 °C) 99 %      MAP       --         Physical Exam    Nursing note and vitals reviewed.  Constitutional: She appears well-developed and well-nourished.   HENT:   Head: Normocephalic and atraumatic.   Eyes: EOM are normal. Pupils are equal, round, and reactive to light.   Neck: Neck supple.   Normal range of motion.  Cardiovascular:  Normal rate, regular rhythm and normal heart sounds.           Pulmonary/Chest: Breath sounds normal.   Abdominal: Abdomen is soft. Bowel sounds are normal. There is no abdominal tenderness.   Musculoskeletal:         General: Edema (1+ pitting edema ble) present. Normal range of motion.      Cervical back: Normal range of motion and neck supple.     Neurological: She is alert and oriented to person, place, and time.   Skin: Skin is warm and dry. Capillary refill takes less than 2 seconds.   Psychiatric: She has a normal mood and affect.         ED Course   Procedures  Labs Reviewed   COMPREHENSIVE METABOLIC PANEL - Abnormal; Notable for the following components:       Result Value    Glucose Level 131 (*)     Creatinine 0.61 (*)     All other components within normal limits   CBC WITH DIFFERENTIAL - Abnormal; Notable for the following components:    Mono # 0.38 (*)     IG# 0.00 (*)     All other components within normal limits   COMPREHENSIVE METABOLIC PANEL - Abnormal; Notable for the following components:    Glucose Level 139 (*)     Creatinine 0.64 (*)     All other components within normal limits   CBC WITH DIFFERENTIAL - Abnormal; Notable for the following components:    Eos % 0.3 (*)     Mono # 0.51 (*)     Eos # 0.02 (*)     All other components within normal limits   TROPONIN I - Normal   CK - Normal   CK-MB - Normal   URINALYSIS, REFLEX TO URINE CULTURE - Normal    Narrative:      URINE STABILITY IS 2 HOURS AT ROOM TEMP OR    SIX HOURS REFRIGERATED. PERFORMING TESTING ON    SPECIMENS GREATER THAN THIS AGE  MAY AFFECT THE    FOLLOWING TESTS:    PH          SPECIFIC GRAVITY           BLOOD    CLARITY     BILIRUBIN               UROBILINOGEN   MAGNESIUM - Normal   TSH - Normal   LACTIC ACID, PLASMA - Normal   TROPONIN I - Normal   CBC W/ AUTO DIFFERENTIAL    Narrative:     The following orders were created for panel order CBC Auto Differential.  Procedure                               Abnormality         Status                     ---------                               -----------         ------                     CBC with Differential[[id number]]       Abnormal            Final result                 Please view results for these tests on the individual orders.   CBC W/ AUTO DIFFERENTIAL    Narrative:     The following orders were created for panel order CBC auto differential.  Procedure                               Abnormality         Status                     ---------                               -----------         ------                     CBC with Differential[[id number]]       Abnormal            Final result                 Please view results for these tests on the individual orders.     EKG Readings: (Independently Interpreted)   NSR, nl ST, nl T, 1st AVB     ECG Results              EKG 12-lead (Preliminary result)  Result time 01/06/24 17:26:59      Wet Read by Arian Yee MD (01/06/24 17:26:59, [facility] Medicine)    Sinus rhythm with a first-degree AV block, normal axis, normal P waves, normal QRS, normal ST segments, normal T-waves, normal QT.                                  Imaging Results    None          Medications   0.9%  NaCl infusion ( Intravenous Rate/Dose Change 1/6/24 8457)   hydrALAZINE injection 20 mg (20 mg Intravenous Given 1/6/24 1646)   cloNIDine tablet 0.1 mg (0.1 mg Oral Given 1/6/24 1629)   ondansetron disintegrating tablet 4 mg (4 mg Oral Given 1/6/24 1732)   sodium chloride 0.9% bolus 500 mL 500 mL (500 mLs Intravenous New Bag  1/6/24 1731)   LORazepam injection 0.5 mg (0.5 mg Intravenous Given 1/6/24 1900)     Medical Decision Making  Amount and/or Complexity of Data Reviewed  Labs: ordered. Decision-making details documented in ED Course.  Discussion of management or test interpretation with external provider(s): Patient now feels nauseated, after her pressure dropped after meds.  Zofran and IV fluids ordered    Workup to this point has been negative.  She is feeling a bit better, but not well enough to go home.  She would like something to help her relax.  Continue the IV fluids, at a small dose Ativan, and reassess in an hour.    Patient has been in the emergency department for over 8 hours.  Her vital signs have been stable.  Her repeat lab work was all unchanged.  I can not find a specific reason for the patient's feeling of uneasiness.  She is safe to be discharged home.    Patient is still feeling poorly.  We will repeat labs.  Patient is now resting comfortably.  Daughter does not want to wake her at this time.  We will continue to monitor.    Risk  Prescription drug management.               ED Course as of 01/06/24 2354   Sat Jan 06, 2024   2305 CBC auto differential(!)  No significant change, considering IV hydration in the interim. [TM]   2349 Comprehensive metabolic panel(!)  No change [TM]   2349 Troponin I  Still negative [TM]   2349 Lactic acid, plasma  Normal [TM]   2349 Magnesium  Normal [TM]   2349 TSH  Normal [TM]      ED Course User Index  [TM] Arian Yee MD                           Clinical Impression:  Final diagnoses:  [R53.1] Weak  [I10] Hypertension, unspecified type  [R53.81, R53.83] Malaise and fatigue (Primary)          ED Disposition Condition    Discharge Good          ED Prescriptions    None       Follow-up Information       Follow up With Specialties Details Why Contact Info    Ravi Sanchez MD Pediatrics Call in 1 day  [address] [id number] [phone]                Arian Yee MD  01/06/24 2642       Arian Yee MD  01/06/24 3635

## 2024-01-07 VITALS
BODY MASS INDEX: 34.43 KG/M2 | RESPIRATION RATE: 20 BRPM | TEMPERATURE: 98 F | HEIGHT: 61 IN | HEART RATE: 85 BPM | WEIGHT: 182.38 LBS | DIASTOLIC BLOOD PRESSURE: 70 MMHG | SYSTOLIC BLOOD PRESSURE: 127 MMHG | OXYGEN SATURATION: 97 %

## 2024-01-09 ENCOUNTER — OFFICE VISIT (OUTPATIENT)
Dept: FAMILY MEDICINE | Facility: CLINIC | Age: 89
End: 2024-01-09
Payer: MEDICARE

## 2024-01-09 VITALS
SYSTOLIC BLOOD PRESSURE: 138 MMHG | HEART RATE: 85 BPM | HEIGHT: 61 IN | TEMPERATURE: 98 F | WEIGHT: 176.63 LBS | DIASTOLIC BLOOD PRESSURE: 72 MMHG | BODY MASS INDEX: 33.35 KG/M2 | OXYGEN SATURATION: 97 %

## 2024-01-09 DIAGNOSIS — I10 PRIMARY HYPERTENSION: Primary | ICD-10-CM

## 2024-01-09 PROCEDURE — 99214 OFFICE O/P EST MOD 30 MIN: CPT | Mod: ,,, | Performed by: PEDIATRICS

## 2024-01-09 PROCEDURE — 93000 ELECTROCARDIOGRAM COMPLETE: CPT | Mod: ,,, | Performed by: PEDIATRICS

## 2024-01-09 PROCEDURE — 1159F MED LIST DOCD IN RCRD: CPT | Mod: ,,, | Performed by: PEDIATRICS

## 2024-01-09 PROCEDURE — 1126F AMNT PAIN NOTED NONE PRSNT: CPT | Mod: ,,, | Performed by: PEDIATRICS

## 2024-01-09 PROCEDURE — 1160F RVW MEDS BY RX/DR IN RCRD: CPT | Mod: ,,, | Performed by: PEDIATRICS

## 2024-01-09 NOTE — PROGRESS NOTES
Subjective     Patient ID: Pinky West is a 89 y.o. female.    Chief Complaint: ER Follow up (hypertension)    HPI    She is here to follow-up a recent ER trip.  She was in her usual state of health until this episode.  At that time she said she had a generalized ill feeling and malaise.  She had a little bit of dizziness and lightheadedness. She decided to take her blood pressure and it was 195/100.  She went to the ER.  She was give medicine for her blood pressure and monitored.  Blood tests did not show any definite abnormalities. ECG was done and telemetry monitoring was not concerning at that time. Today she says she still just doesn't feel well but she can't give specific symptoms.  She denies ever having chest tightness or pressure, dyspnea, weakness or headaches.  She does not report any slurred speech or facial numbness or weakness.  She has been taking her amlodipine regularly but she has not measured her blood pressure recently. It was previously well controlled.    Objective     Physical Exam     She looks to be at her baseline  Neck supple without bruit  Heart RRR without murmurs or ectopy  Lungs clear  Abdomen soft and not tender, no bruit, no HSM      I reviewed her studies from the ER trip    Assessment and Plan     1. Primary hypertension  -     POCT EKG 12-LEAD (Manually Resulted by Ordering Provider)        I recommended she take her blood pressure daily over the next 1-2 weeks and call with results. ECG today does not show any significant abnormalities or changes from the recent ECG.    I'm not sure what is causing her symptoms but I am concerned about the abruptness of her symptoms associated with the acute elevation in her blood pressure.  I am concerned this may represent an anginal equivalent non ST-elevation MI or TIA.  She had 1 set of normal cardiac enzymes in the emergency room.  I recommended she see a cardiologist to talk about her episode and to consider doing carotid ultrasound and  possibly a nuclear stress test they agree.

## 2024-01-10 ENCOUNTER — PATIENT MESSAGE (OUTPATIENT)
Dept: FAMILY MEDICINE | Facility: CLINIC | Age: 89
End: 2024-01-10
Payer: MEDICARE

## 2024-02-19 ENCOUNTER — TELEPHONE (OUTPATIENT)
Dept: FAMILY MEDICINE | Facility: CLINIC | Age: 89
End: 2024-02-19
Payer: MEDICARE

## 2024-02-19 NOTE — TELEPHONE ENCOUNTER
Pt is wanting to be seen for arm pain. Denies any fall. Please advise.         Pinky - [phone]

## 2024-02-21 ENCOUNTER — OFFICE VISIT (OUTPATIENT)
Dept: FAMILY MEDICINE | Facility: CLINIC | Age: 89
End: 2024-02-21
Payer: MEDICARE

## 2024-02-21 VITALS
HEART RATE: 90 BPM | DIASTOLIC BLOOD PRESSURE: 72 MMHG | OXYGEN SATURATION: 98 % | SYSTOLIC BLOOD PRESSURE: 132 MMHG | TEMPERATURE: 98 F | BODY MASS INDEX: 33.64 KG/M2 | WEIGHT: 178.19 LBS | HEIGHT: 61 IN

## 2024-02-21 DIAGNOSIS — B97.89 VIRAL RESPIRATORY INFECTION: Primary | ICD-10-CM

## 2024-02-21 DIAGNOSIS — M79.601 PAIN OF RIGHT UPPER EXTREMITY: ICD-10-CM

## 2024-02-21 DIAGNOSIS — J98.8 VIRAL RESPIRATORY INFECTION: Primary | ICD-10-CM

## 2024-02-21 DIAGNOSIS — R52 BODY ACHES: ICD-10-CM

## 2024-02-21 LAB
CTP QC/QA: YES
SARS-COV-2 AG RESP QL IA.RAPID: NEGATIVE

## 2024-02-21 PROCEDURE — 87811 SARS-COV-2 COVID19 W/OPTIC: CPT | Mod: QW,RHCUB | Performed by: PEDIATRICS

## 2024-02-21 PROCEDURE — 99213 OFFICE O/P EST LOW 20 MIN: CPT | Mod: ,,, | Performed by: PEDIATRICS

## 2024-02-21 PROCEDURE — 1159F MED LIST DOCD IN RCRD: CPT | Mod: ,,, | Performed by: PEDIATRICS

## 2024-02-21 PROCEDURE — 1160F RVW MEDS BY RX/DR IN RCRD: CPT | Mod: ,,, | Performed by: PEDIATRICS

## 2024-02-21 RX ORDER — NIFEDIPINE 30 MG/1
30 TABLET, FILM COATED, EXTENDED RELEASE ORAL DAILY
COMMUNITY
Start: 2024-02-19

## 2024-02-21 NOTE — PROGRESS NOTES
Subjective     Patient ID: Pinky West is a 89 y.o. female.    Chief Complaint: Arm Pain and Generalized Body Aches (She wants a covid test)    HPI    She is here with 1 day of malaise, generalized achiness, nasal congestion, and a slight sore throat.  She requested a COVID test.  She does not have definite fevers or chills.  She is breathing normally.  He has a minimal cough.  He does not have vomiting or diarrhea.  She is eating and drinking normally.    She also has intermittent pain in her right bicep area.  It is a dull achiness.  She did not have any trauma.  Sometimes the arm does not hurt her at all.  She does not have numbness or weakness.     Objective     Physical Exam     No distress  Conjunctiva clear  Mild nasal congestion  Heart regular rate and rhythm  Lungs are clear, she is breathing normally, no wheezing  The right elbow has normal range of motion, there is no area of point tenderness, no lymphadenopathy, no redness or swelling, the area of pain is in the distal biceps and biceps tendon, her strength is good for her age    Assessment and Plan     1. Viral respiratory infection    2. Body aches  -     SARS Coronavirus 2 Antigen, POCT Manual Read; Future; Expected date: 02/21/2024    3. Pain of right upper extremity      Hydrate  OTC medicine as needed for symptoms - call if they worsen significantly  I think the arm pain may be some biceps strain/tendonitis - observe for now and call if the pain is persistent - she may need further evaluation

## 2024-02-27 ENCOUNTER — OFFICE VISIT (OUTPATIENT)
Dept: FAMILY MEDICINE | Facility: CLINIC | Age: 89
End: 2024-02-27
Payer: MEDICARE

## 2024-02-27 ENCOUNTER — APPOINTMENT (OUTPATIENT)
Dept: RADIOLOGY | Facility: CLINIC | Age: 89
End: 2024-02-27
Attending: PEDIATRICS
Payer: MEDICARE

## 2024-02-27 VITALS
HEART RATE: 100 BPM | HEIGHT: 61 IN | SYSTOLIC BLOOD PRESSURE: 128 MMHG | BODY MASS INDEX: 33.79 KG/M2 | WEIGHT: 179 LBS | OXYGEN SATURATION: 98 % | TEMPERATURE: 98 F | DIASTOLIC BLOOD PRESSURE: 68 MMHG

## 2024-02-27 DIAGNOSIS — M79.601 RIGHT ARM PAIN: ICD-10-CM

## 2024-02-27 DIAGNOSIS — M79.601 RIGHT ARM PAIN: Primary | ICD-10-CM

## 2024-02-27 DIAGNOSIS — K21.9 GASTROESOPHAGEAL REFLUX DISEASE WITHOUT ESOPHAGITIS: ICD-10-CM

## 2024-02-27 DIAGNOSIS — R93.6 ABNORMAL X-RAY OF HUMERUS: ICD-10-CM

## 2024-02-27 PROCEDURE — 1160F RVW MEDS BY RX/DR IN RCRD: CPT | Mod: ,,, | Performed by: PEDIATRICS

## 2024-02-27 PROCEDURE — 73060 X-RAY EXAM OF HUMERUS: CPT | Mod: TC,RHCUB,RT | Performed by: PEDIATRICS

## 2024-02-27 PROCEDURE — 99213 OFFICE O/P EST LOW 20 MIN: CPT | Mod: ,,, | Performed by: PEDIATRICS

## 2024-02-27 PROCEDURE — 1159F MED LIST DOCD IN RCRD: CPT | Mod: ,,, | Performed by: PEDIATRICS

## 2024-02-27 RX ORDER — MELOXICAM 15 MG/1
TABLET ORAL
Qty: 30 TABLET | Refills: 2 | Status: SHIPPED | OUTPATIENT
Start: 2024-02-27 | End: 2024-04-23

## 2024-02-27 RX ORDER — OMEPRAZOLE 20 MG/1
20 CAPSULE, DELAYED RELEASE ORAL DAILY
Qty: 30 CAPSULE | Refills: 0 | Status: CANCELLED | OUTPATIENT
Start: 2024-02-27 | End: 2024-03-28

## 2024-02-27 RX ORDER — OMEPRAZOLE 20 MG/1
20 CAPSULE, DELAYED RELEASE ORAL DAILY
Qty: 30 CAPSULE | Refills: 1 | Status: SHIPPED | OUTPATIENT
Start: 2024-02-27 | End: 2024-02-27 | Stop reason: SDUPTHER

## 2024-02-27 RX ORDER — OMEPRAZOLE 20 MG/1
20 CAPSULE, DELAYED RELEASE ORAL DAILY
Qty: 30 CAPSULE | Refills: 1 | Status: SHIPPED | OUTPATIENT
Start: 2024-02-27 | End: 2024-04-23

## 2024-02-27 NOTE — PROGRESS NOTES
Subjective     Patient ID: Pinky West is a 89 y.o. female.    Chief Complaint: Arm Pain (Rt arm pain)    HPI    She has right arm pain off and on for many months.  She does not recall any trauma.  The pain does not radiate from her neck or shoulder. She does not have weakness.  She does not report swelling or stiffness.  The pain is mostly in the distal third of the humerus.      Objective     Physical Exam     Normal ROM of the elbow and shoulder for age, no point tenderness, strength intact, no swelling, no palpable mass    Assessment and Plan     1. Right arm pain  -     X-Ray Humerus 2 View Right; Future; Expected date: 02/27/2024    2. Gastroesophageal reflux disease without esophagitis  -     omeprazole (PRILOSEC) 20 MG capsule; Take 1 capsule (20 mg total) by mouth once daily.  Dispense: 30 capsule; Refill: 1    Other orders  -     Discontinue: omeprazole (PRILOSEC) 20 MG capsule; Take 1 capsule (20 mg total) by mouth once daily.  Dispense: 30 capsule; Refill: 1  -     meloxicam (MOBIC) 15 MG tablet; One po once daily if needed for arthritis pain - take for 7 days then as needed  Dispense: 30 tablet; Refill: 2      I think these periosteal changes in the area of the deltoid tuberosity are a normal variant - we'll order a bone scan - if abnormal we'll work up further.  For now I recommended she take meloxicam daily for 5-7 days at a time and then take a break.

## 2024-03-22 ENCOUNTER — TELEPHONE (OUTPATIENT)
Dept: FAMILY MEDICINE | Facility: CLINIC | Age: 89
End: 2024-03-22
Payer: MEDICARE

## 2024-03-22 DIAGNOSIS — N39.0 URINARY TRACT INFECTION WITHOUT HEMATURIA, SITE UNSPECIFIED: Primary | ICD-10-CM

## 2024-03-22 RX ORDER — CIPROFLOXACIN 500 MG/1
500 TABLET ORAL EVERY 12 HOURS
Qty: 10 TABLET | Refills: 0 | Status: SHIPPED | OUTPATIENT
Start: 2024-03-22 | End: 2024-03-27

## 2024-03-22 NOTE — TELEPHONE ENCOUNTER
Pt stated for the last 2 days she has had burning with urination. No other symptoms.  She asked not to come in.  Dr. Sanchez said that we can send out something this time but next time she will need to come in.  He said to send Cipro 500mg bid for 5 days.

## 2024-03-25 ENCOUNTER — TELEPHONE (OUTPATIENT)
Dept: FAMILY MEDICINE | Facility: CLINIC | Age: 89
End: 2024-03-25
Payer: MEDICARE

## 2024-03-25 NOTE — TELEPHONE ENCOUNTER
Pt stated she has not heard about the test for her arm.  I called radiology and they stated they tried several unsuccessful times to get in touch with her.  I scheduled it for this Wednesday and notified pt.

## 2024-03-27 ENCOUNTER — HOSPITAL ENCOUNTER (OUTPATIENT)
Dept: RADIOLOGY | Facility: HOSPITAL | Age: 89
Discharge: HOME | End: 2024-03-27
Attending: PEDIATRICS
Payer: MEDICARE

## 2024-03-27 VITALS — WEIGHT: 188 LBS | BODY MASS INDEX: 35.55 KG/M2

## 2024-03-27 DIAGNOSIS — R93.6 ABNORMAL X-RAY OF HUMERUS: ICD-10-CM

## 2024-03-27 DIAGNOSIS — M79.601 RIGHT ARM PAIN: ICD-10-CM

## 2024-03-27 PROCEDURE — 78306 BONE IMAGING WHOLE BODY: CPT | Mod: TC

## 2024-03-27 PROCEDURE — A9503 TC99M MEDRONATE: HCPCS | Performed by: PEDIATRICS

## 2024-03-27 RX ADMIN — TECHNETIUM TC 99M MEDRONATE 20 MILLICURIE: 20 INJECTION, POWDER, LYOPHILIZED, FOR SOLUTION INTRAVENOUS at 10:03

## 2024-04-19 ENCOUNTER — PATIENT MESSAGE (OUTPATIENT)
Dept: FAMILY MEDICINE | Facility: CLINIC | Age: 89
End: 2024-04-19
Payer: MEDICARE

## 2024-04-23 ENCOUNTER — OFFICE VISIT (OUTPATIENT)
Dept: FAMILY MEDICINE | Facility: CLINIC | Age: 89
End: 2024-04-23
Payer: MEDICARE

## 2024-04-23 VITALS
DIASTOLIC BLOOD PRESSURE: 78 MMHG | SYSTOLIC BLOOD PRESSURE: 138 MMHG | HEIGHT: 61 IN | BODY MASS INDEX: 33.49 KG/M2 | WEIGHT: 177.38 LBS | TEMPERATURE: 99 F | OXYGEN SATURATION: 96 % | HEART RATE: 101 BPM

## 2024-04-23 DIAGNOSIS — G89.29 CHRONIC PAIN OF BOTH SHOULDERS: Primary | ICD-10-CM

## 2024-04-23 DIAGNOSIS — M25.512 CHRONIC PAIN OF BOTH SHOULDERS: Primary | ICD-10-CM

## 2024-04-23 DIAGNOSIS — M25.511 CHRONIC PAIN OF BOTH SHOULDERS: Primary | ICD-10-CM

## 2024-04-23 PROCEDURE — 3288F FALL RISK ASSESSMENT DOCD: CPT | Mod: ,,, | Performed by: PEDIATRICS

## 2024-04-23 PROCEDURE — 1159F MED LIST DOCD IN RCRD: CPT | Mod: ,,, | Performed by: PEDIATRICS

## 2024-04-23 PROCEDURE — 99213 OFFICE O/P EST LOW 20 MIN: CPT | Mod: ,,, | Performed by: PEDIATRICS

## 2024-04-23 PROCEDURE — 1160F RVW MEDS BY RX/DR IN RCRD: CPT | Mod: ,,, | Performed by: PEDIATRICS

## 2024-04-23 PROCEDURE — 1101F PT FALLS ASSESS-DOCD LE1/YR: CPT | Mod: ,,, | Performed by: PEDIATRICS

## 2024-04-23 RX ORDER — PREDNISONE 20 MG/1
TABLET ORAL
Qty: 10 TABLET | Refills: 1 | Status: SHIPPED | OUTPATIENT
Start: 2024-04-23 | End: 2024-06-13

## 2024-04-23 RX ORDER — TRAMADOL HYDROCHLORIDE 50 MG/1
TABLET ORAL
Qty: 30 TABLET | Refills: 1 | Status: SHIPPED | OUTPATIENT
Start: 2024-04-23 | End: 2024-06-13

## 2024-04-23 NOTE — PROGRESS NOTES
Subjective     Patient ID: Pinky West is a 89 y.o. female.    Chief Complaint: Shoulder Pain (Bilateral )    HPI    She continues to have bilateral shoulder pain and decreased range of motion most days.  The right shoulders hurting her more than the left.  Seems to involve proximal humerus as well.  She underwent workup for this few weeks ago when the pain seem more localized to the proximal humerus.  She had an x-ray and a bone scan that did not show any significant abnormalities.  She continues to deny significant neck symptoms or numbness or weakness in her hands or legs.  She reports mild stiffness.     Objective     Physical Exam     She has decreased range of motion in her shoulders in all directions and the pain seems to be deep in her shoulder joint without any focal localized tenderness  Her strength is within normal limits for her age  Mental status continues to be normal  and at her baseline    Assessment and Plan     1. Chronic pain of both shoulders    Other orders  -     predniSONE (DELTASONE) 20 MG tablet; One po once daily for 5 days then 1/2 po once daily for 5 days  Dispense: 10 tablet; Refill: 1  -     traMADoL (ULTRAM) 50 mg tablet; 1 po q 8 hours prn pain - take with 500 mg of acetaminophen  Dispense: 30 tablet; Refill: 1      We will add some low-dose tramadol to be used sparingly with Tylenol as needed for severe pain.  We talked about the risks and benefits of doing this and the importance of not taking it every day if possible.  Try low-dose prednisone in case she has PMR.  If that does not help much then she will need to see an orthopedist to consider frozen shoulder and other etiologies

## 2024-06-11 ENCOUNTER — TELEPHONE (OUTPATIENT)
Dept: FAMILY MEDICINE | Facility: CLINIC | Age: 89
End: 2024-06-11
Payer: MEDICARE

## 2024-06-11 NOTE — TELEPHONE ENCOUNTER
Pt is requesting a shower chair. Pt has not been seen since April and doesn't have a f/u scheduled. Please advise.   
hard copy

## 2024-06-13 ENCOUNTER — TELEPHONE (OUTPATIENT)
Dept: FAMILY MEDICINE | Facility: CLINIC | Age: 89
End: 2024-06-13

## 2024-06-13 ENCOUNTER — OFFICE VISIT (OUTPATIENT)
Dept: FAMILY MEDICINE | Facility: CLINIC | Age: 89
End: 2024-06-13
Payer: MEDICARE

## 2024-06-13 VITALS
HEART RATE: 113 BPM | TEMPERATURE: 98 F | OXYGEN SATURATION: 98 % | SYSTOLIC BLOOD PRESSURE: 136 MMHG | WEIGHT: 175.63 LBS | HEIGHT: 61 IN | DIASTOLIC BLOOD PRESSURE: 70 MMHG | BODY MASS INDEX: 33.16 KG/M2

## 2024-06-13 DIAGNOSIS — R53.1 WEAKNESS: ICD-10-CM

## 2024-06-13 DIAGNOSIS — R26.89 POOR BALANCE: ICD-10-CM

## 2024-06-13 DIAGNOSIS — M19.90 ARTHRITIS: Primary | ICD-10-CM

## 2024-06-13 PROCEDURE — 3288F FALL RISK ASSESSMENT DOCD: CPT | Mod: ,,, | Performed by: PEDIATRICS

## 2024-06-13 PROCEDURE — 1126F AMNT PAIN NOTED NONE PRSNT: CPT | Mod: ,,, | Performed by: PEDIATRICS

## 2024-06-13 PROCEDURE — 99212 OFFICE O/P EST SF 10 MIN: CPT | Mod: ,,, | Performed by: PEDIATRICS

## 2024-06-13 PROCEDURE — 1101F PT FALLS ASSESS-DOCD LE1/YR: CPT | Mod: ,,, | Performed by: PEDIATRICS

## 2024-06-13 PROCEDURE — 1160F RVW MEDS BY RX/DR IN RCRD: CPT | Mod: ,,, | Performed by: PEDIATRICS

## 2024-06-13 PROCEDURE — 1159F MED LIST DOCD IN RCRD: CPT | Mod: ,,, | Performed by: PEDIATRICS

## 2024-06-13 RX ORDER — ROSUVASTATIN CALCIUM 5 MG/1
5 TABLET, COATED ORAL DAILY
COMMUNITY
Start: 2024-05-26

## 2024-06-13 NOTE — TELEPHONE ENCOUNTER
[facility] does not take her insurance.  They gave her the names of 3 places to try.  She will call them to see who she wants to use so we can send order for shower chair.  She will call back and let us know.

## 2024-06-13 NOTE — PROGRESS NOTES
Subjective     Patient ID: Pinky West is a 89 y.o. female.    Chief Complaint: shower chair    HPI    She is requesting a shower chair.  She is no longer safely able to get in and out of the bath tub due to declining balance and some weakness in her thighs and arms and shoulders. She has trouble standing for long periods in the shower due to her balance.       Objective     Physical Exam     She has slightly limited gait due to some stiffness in her hips, knees and back  She has slight weakness in her thighs with standing  She has minimal decline in balance skills when standing and walking    Assessment and Plan     1. Arthritis    2. Weakness    3. Poor balance        I will send an order for a shower share so we can hopefully prevent a fall.

## 2024-06-28 ENCOUNTER — TELEPHONE (OUTPATIENT)
Dept: FAMILY MEDICINE | Facility: CLINIC | Age: 89
End: 2024-06-28
Payer: MEDICARE

## 2024-06-28 DIAGNOSIS — I10 HYPERTENSION, UNSPECIFIED TYPE: Primary | ICD-10-CM

## 2024-06-28 RX ORDER — NIFEDIPINE 30 MG/1
30 TABLET, FILM COATED, EXTENDED RELEASE ORAL DAILY
Qty: 30 TABLET | Refills: 0 | Status: SHIPPED | OUTPATIENT
Start: 2024-06-28

## 2024-11-04 ENCOUNTER — TELEPHONE (OUTPATIENT)
Dept: FAMILY MEDICINE | Facility: CLINIC | Age: 89
End: 2024-11-04
Payer: MEDICARE

## 2024-11-04 DIAGNOSIS — J30.89 ALLERGIC RHINITIS DUE TO OTHER ALLERGIC TRIGGER, UNSPECIFIED SEASONALITY: Primary | ICD-10-CM

## 2024-11-04 RX ORDER — FLUTICASONE PROPIONATE 50 MCG
2 SPRAY, SUSPENSION (ML) NASAL DAILY
Qty: 16 G | Refills: 1 | Status: SHIPPED | OUTPATIENT
Start: 2024-11-04

## 2024-12-12 ENCOUNTER — LAB VISIT (OUTPATIENT)
Dept: LAB | Facility: HOSPITAL | Age: 89
End: 2024-12-12
Attending: SURGERY
Payer: MEDICARE

## 2024-12-12 DIAGNOSIS — D12.6 BENIGN NEOPLASM OF COLON: Primary | ICD-10-CM

## 2024-12-12 DIAGNOSIS — Z12.11 SPECIAL SCREENING FOR MALIGNANT NEOPLASMS, COLON: ICD-10-CM

## 2024-12-12 LAB
COLOR STL: NORMAL
CONSISTENCY STL: NORMAL
HEMOCCULT SP1 STL QL: NEGATIVE

## 2024-12-12 PROCEDURE — 82270 OCCULT BLOOD FECES: CPT

## 2024-12-23 ENCOUNTER — HOSPITAL ENCOUNTER (EMERGENCY)
Facility: HOSPITAL | Age: 89
Discharge: HOME | End: 2024-12-24
Payer: MEDICARE

## 2024-12-23 DIAGNOSIS — I10 HYPERTENSION: ICD-10-CM

## 2024-12-23 DIAGNOSIS — R03.0 ELEVATED BLOOD PRESSURE READING: Primary | ICD-10-CM

## 2024-12-23 LAB
BASOPHILS # BLD AUTO: 0.04 X10(3)/MCL (ref 0.01–0.08)
BASOPHILS NFR BLD AUTO: 0.8 % (ref 0.1–1.2)
BILIRUB UR QL STRIP.AUTO: NEGATIVE
CLARITY UR: CLEAR
COLOR UR AUTO: YELLOW
EOSINOPHIL # BLD AUTO: 0.23 X10(3)/MCL (ref 0.04–0.36)
EOSINOPHIL NFR BLD AUTO: 4.7 % (ref 0.7–7)
ERYTHROCYTE [DISTWIDTH] IN BLOOD BY AUTOMATED COUNT: 13.2 % (ref 11–14.5)
GLUCOSE UR QL STRIP: NEGATIVE
HCT VFR BLD AUTO: 40.6 % (ref 36–48)
HGB BLD-MCNC: 13.7 G/DL (ref 11.8–16)
HGB UR QL STRIP: ABNORMAL
IMM GRANULOCYTES # BLD AUTO: 0.01 X10(3)/MCL (ref 0–0.03)
IMM GRANULOCYTES NFR BLD AUTO: 0.2 % (ref 0–0.5)
KETONES UR QL STRIP: NEGATIVE
LEUKOCYTE ESTERASE UR QL STRIP: NEGATIVE
LYMPHOCYTES # BLD AUTO: 2.21 X10(3)/MCL (ref 1.16–3.74)
LYMPHOCYTES NFR BLD AUTO: 45.4 % (ref 20–55)
MCH RBC QN AUTO: 30 PG (ref 27–34)
MCHC RBC AUTO-ENTMCNC: 33.7 G/DL (ref 31–37)
MCV RBC AUTO: 89 FL (ref 79–99)
MONOCYTES # BLD AUTO: 0.53 X10(3)/MCL (ref 0.24–0.36)
MONOCYTES NFR BLD AUTO: 10.9 % (ref 4.7–12.5)
NEUTROPHILS # BLD AUTO: 1.85 X10(3)/MCL (ref 1.56–6.13)
NEUTROPHILS NFR BLD AUTO: 38 % (ref 37–73)
NITRITE UR QL STRIP: NEGATIVE
NRBC BLD AUTO-RTO: 0 %
PH UR STRIP: 6 [PH]
PLATELET # BLD AUTO: 236 X10(3)/MCL (ref 140–371)
PMV BLD AUTO: 9.8 FL (ref 9.4–12.4)
PROT UR QL STRIP: ABNORMAL
RBC # BLD AUTO: 4.56 X10(6)/MCL (ref 4–5.1)
SP GR UR STRIP.AUTO: 1.01 (ref 1–1.03)
UROBILINOGEN UR STRIP-ACNC: 0.2
WBC # BLD AUTO: 4.87 X10(3)/MCL (ref 4–11.5)

## 2024-12-23 PROCEDURE — 81015 MICROSCOPIC EXAM OF URINE: CPT

## 2024-12-23 PROCEDURE — 83880 ASSAY OF NATRIURETIC PEPTIDE: CPT

## 2024-12-23 PROCEDURE — 80053 COMPREHEN METABOLIC PANEL: CPT

## 2024-12-23 PROCEDURE — 93010 ELECTROCARDIOGRAM REPORT: CPT | Mod: ,,, | Performed by: INTERNAL MEDICINE

## 2024-12-23 PROCEDURE — 85025 COMPLETE CBC W/AUTO DIFF WBC: CPT

## 2024-12-23 PROCEDURE — 93005 ELECTROCARDIOGRAM TRACING: CPT

## 2024-12-23 PROCEDURE — 99284 EMERGENCY DEPT VISIT MOD MDM: CPT | Mod: 25

## 2024-12-23 PROCEDURE — 25000003 PHARM REV CODE 250

## 2024-12-23 PROCEDURE — 81003 URINALYSIS AUTO W/O SCOPE: CPT

## 2024-12-23 PROCEDURE — 84484 ASSAY OF TROPONIN QUANT: CPT

## 2024-12-23 RX ORDER — CLONIDINE HYDROCHLORIDE 0.1 MG/1
0.1 TABLET ORAL
Status: DISCONTINUED | OUTPATIENT
Start: 2024-12-23 | End: 2024-12-23

## 2024-12-23 RX ORDER — AMLODIPINE BESYLATE 5 MG/1
10 TABLET ORAL
Status: COMPLETED | OUTPATIENT
Start: 2024-12-23 | End: 2024-12-23

## 2024-12-23 RX ORDER — ONDANSETRON 4 MG/1
4 TABLET, ORALLY DISINTEGRATING ORAL
Status: COMPLETED | OUTPATIENT
Start: 2024-12-23 | End: 2024-12-23

## 2024-12-23 RX ORDER — ALPRAZOLAM 0.5 MG/1
0.5 TABLET ORAL
Status: COMPLETED | OUTPATIENT
Start: 2024-12-23 | End: 2024-12-23

## 2024-12-23 RX ORDER — ALPRAZOLAM 0.5 MG/1
0.5 TABLET ORAL
Status: COMPLETED | OUTPATIENT
Start: 2024-12-24 | End: 2024-12-23

## 2024-12-23 RX ADMIN — AMLODIPINE BESYLATE 10 MG: 5 TABLET ORAL at 11:12

## 2024-12-23 RX ADMIN — ALPRAZOLAM 0.5 MG: 0.5 TABLET ORAL at 11:12

## 2024-12-23 RX ADMIN — ONDANSETRON 4 MG: 4 TABLET, ORALLY DISINTEGRATING ORAL at 11:12

## 2024-12-24 VITALS
HEIGHT: 62 IN | SYSTOLIC BLOOD PRESSURE: 149 MMHG | WEIGHT: 191 LBS | TEMPERATURE: 98 F | BODY MASS INDEX: 35.15 KG/M2 | OXYGEN SATURATION: 97 % | HEART RATE: 64 BPM | DIASTOLIC BLOOD PRESSURE: 71 MMHG | RESPIRATION RATE: 18 BRPM

## 2024-12-24 PROBLEM — R03.0 ELEVATED BLOOD PRESSURE READING: Status: ACTIVE | Noted: 2024-12-24

## 2024-12-24 LAB
ALBUMIN SERPL-MCNC: 4.4 G/DL (ref 3.4–5)
ALBUMIN/GLOB SERPL: 1.2 RATIO
ALP SERPL-CCNC: 83 UNIT/L (ref 50–144)
ALT SERPL-CCNC: 15 UNIT/L (ref 1–45)
ANION GAP SERPL CALC-SCNC: 7 MEQ/L (ref 2–13)
AST SERPL-CCNC: 30 UNIT/L (ref 14–36)
BACTERIA #/AREA URNS AUTO: NORMAL /HPF
BILIRUB SERPL-MCNC: 0.3 MG/DL (ref 0–1)
BNP BLD-MCNC: 23.3 PG/ML (ref 0–124.9)
BUN SERPL-MCNC: 18 MG/DL (ref 7–20)
CALCIUM SERPL-MCNC: 9 MG/DL (ref 8.4–10.2)
CHLORIDE SERPL-SCNC: 102 MMOL/L (ref 98–110)
CO2 SERPL-SCNC: 28 MMOL/L (ref 21–32)
CREAT SERPL-MCNC: 0.73 MG/DL (ref 0.66–1.25)
CREAT/UREA NIT SERPL: 25 (ref 12–20)
GFR SERPLBLD CREATININE-BSD FMLA CKD-EPI: 78 ML/MIN/1.73/M2
GLOBULIN SER-MCNC: 3.6 GM/DL (ref 2–3.9)
GLUCOSE SERPL-MCNC: 119 MG/DL (ref 70–115)
POTASSIUM SERPL-SCNC: 4 MMOL/L (ref 3.5–5.1)
PROT SERPL-MCNC: 8 GM/DL (ref 6.3–8.2)
RBC #/AREA URNS AUTO: NORMAL /HPF
SODIUM SERPL-SCNC: 137 MMOL/L (ref 136–145)
SQUAMOUS #/AREA URNS AUTO: NORMAL /HPF
TROPONIN I SERPL-MCNC: <0.012 NG/ML (ref 0–0.03)
WBC #/AREA URNS AUTO: NORMAL /HPF

## 2024-12-24 PROCEDURE — 25000003 PHARM REV CODE 250

## 2024-12-24 RX ORDER — AMLODIPINE BESYLATE 10 MG/1
10 TABLET ORAL DAILY
Qty: 90 TABLET | Refills: 3 | Status: SHIPPED | OUTPATIENT
Start: 2024-12-24 | End: 2025-12-24

## 2024-12-24 NOTE — ED NOTES
Patient stated that she took her blood pressure at home and it was high, so she called her daughter and she brought her to the hospital

## 2024-12-24 NOTE — ED PROVIDER NOTES
Encounter Date: 12/23/2024       History     Chief Complaint   Patient presents with    Hypertension     Reports htn on home monitor tonight, denies pain reports nausea     89-year-old female presents complaining of mild nausea and hypertension.  She was mildly nausea nauseated felt a little lightheaded, so she checked her blood pressure at home, and found it to be high.  She denies any chest pain, shortness of breath, or fever or chills.  She was taken off her amlodipine about a week ago.    The history is provided by the patient.     Review of patient's allergies indicates:   Allergen Reactions    Sucralfate Hives    Iodine Hives and Rash     Past Medical History:   Diagnosis Date    Blood transfusion declined because patient is Jewish     HTN (hypertension)      Past Surgical History:   Procedure Laterality Date    APPENDECTOMY      COLONOSCOPY N/A 12/5/2022    Procedure: COLONOSCOPY;  Surgeon: BRAXTON Ruby MD;  Location: [facility];  Service: Endoscopy;  Laterality: N/A;    HYSTERECTOMY      KNEE SURGERY Left      Family History   Problem Relation Name Age of Onset    No Known Problems Mother      No Known Problems Father       Social History     Tobacco Use    Smoking status: Never    Smokeless tobacco: Never   Substance Use Topics    Alcohol use: Not Currently    Drug use: Never     Review of Systems   Constitutional:  Negative for fever.   HENT:  Negative for sore throat.    Respiratory:  Negative for shortness of breath.    Cardiovascular:  Negative for chest pain.   Gastrointestinal:  Positive for nausea.   Genitourinary:  Negative for dysuria.   Musculoskeletal:  Negative for back pain.   Skin:  Negative for rash.   Neurological:  Positive for light-headedness. Negative for weakness.   Hematological:  Does not bruise/bleed easily.   Psychiatric/Behavioral:  The patient is nervous/anxious.    All other systems reviewed and are negative.      Physical Exam     Initial Vitals [12/23/24  2302]   BP Pulse Resp Temp SpO2   (!) 191/109 72 18 97.8 °F (36.6 °C) 97 %      MAP       --         Physical Exam    Nursing note and vitals reviewed.  Constitutional: Vital signs are normal. She appears well-developed and well-nourished. She is cooperative.   HENT:   Head: Normocephalic and atraumatic. Mouth/Throat: Oropharynx is clear and moist.   Eyes: Conjunctivae, EOM and lids are normal. Pupils are equal, round, and reactive to light.   Neck: Trachea normal. Neck supple.   Normal range of motion.  Cardiovascular:  Normal rate, regular rhythm, normal heart sounds and intact distal pulses.           Pulmonary/Chest: Breath sounds normal.   Abdominal: Abdomen is soft. Bowel sounds are normal.   Musculoskeletal:         General: Normal range of motion.      Cervical back: Normal, normal range of motion and neck supple.      Lumbar back: Normal.     Neurological: She is alert and oriented to person, place, and time. She has normal strength. Coordination normal. GCS score is 15. GCS eye subscore is 4. GCS verbal subscore is 5. GCS motor subscore is 6.   Skin: Skin is warm, dry and intact. Capillary refill takes less than 2 seconds.   Psychiatric: She has a normal mood and affect. Her speech is normal and behavior is normal. Judgment and thought content normal. Cognition and memory are normal.         ED Course   Procedures  Labs Reviewed   COMPREHENSIVE METABOLIC PANEL - Abnormal       Result Value    Sodium 137      Potassium 4.0      Chloride 102      CO2 28      Glucose 119 (*)     Blood Urea Nitrogen 18      Creatinine 0.73      Calcium 9.0      Protein Total 8.0      Albumin 4.4      Globulin 3.6      Albumin/Globulin Ratio 1.2      Bilirubin Total 0.3      ALP 83      ALT 15      AST 30      eGFR 78      Anion Gap 7.0      BUN/Creatinine Ratio 25 (*)    URINALYSIS, REFLEX TO URINE CULTURE - Abnormal    Color, UA Yellow      Appearance, UA Clear      Specific Gravity, UA 1.015      pH, UA 6.0      Protein, UA  Trace (*)     Glucose, UA Negative      Ketones, UA Negative      Blood, UA Trace-Intact (*)     Bilirubin, UA Negative      Urobilinogen, UA 0.2      Nitrites, UA Negative      Leukocyte Esterase, UA Negative      Narrative:      URINE STABILITY IS 2 HOURS AT ROOM TEMP OR    SIX HOURS REFRIGERATED. PERFORMING TESTING ON    SPECIMENS GREATER THAN THIS AGE MAY AFFECT THE    FOLLOWING TESTS:    PH          SPECIFIC GRAVITY           BLOOD    CLARITY     BILIRUBIN               UROBILINOGEN   CBC WITH DIFFERENTIAL - Abnormal    WBC 4.87      RBC 4.56      Hgb 13.7      Hct 40.6      MCV 89.0      MCH 30.0      MCHC 33.7      RDW 13.2      Platelet 236      MPV 9.8      Neut % 38.0      Lymph % 45.4      Mono % 10.9      Eos % 4.7      Basophil % 0.8      Lymph # 2.21      Neut # 1.85      Mono # 0.53 (*)     Eos # 0.23      Baso # 0.04      IG# 0.01      IG% 0.2      NRBC% 0.0     TROPONIN I - Normal    Troponin-I <0.012     NT-PRO NATRIURETIC PEPTIDE - Normal    ProBNP 23.3     URINALYSIS, MICROSCOPIC - Normal    Bacteria, UA Occasional      RBC, UA 0-5      WBC, UA None Seen      Squamous Epithelial Cells, UA Occasional     CBC W/ AUTO DIFFERENTIAL    Narrative:     The following orders were created for panel order CBC auto differential.  Procedure                               Abnormality         Status                     ---------                               -----------         ------                     CBC with Differential[[id number]]       Abnormal            Final result                 Please view results for these tests on the individual orders.        ECG Results              EKG 12-lead (Preliminary result)  Result time 12/23/24 23:23:05      Wet Read by Arian Yee MD (12/23/24 23:23:05, [facility], Emergency Medicine)    Sinus rhythm with a first-degree AV block, heart rate 70, otherwise normal intervals, normal axis, normal P waves, normal QRS, normal ST segments,  normal T-waves, normal QT.                                  Imaging Results    None          Medications   ondansetron disintegrating tablet 4 mg (4 mg Oral Given 12/23/24 2340)   ALPRAZolam tablet 0.5 mg (0.5 mg Oral Given 12/23/24 2340)   amLODIPine tablet 10 mg (10 mg Oral Given 12/23/24 2340)   ALPRAZolam tablet 0.5 mg (0.5 mg Oral Given 12/23/24 2358)     Medical Decision Making  Mild nausea and lightheadedness (both essentially resolved), elevated blood pressure, recently taken off amlodipine  Differential diagnosis:  ACS, gastroenteritis, anxiety, essential hypertension, dehydration, electrolyte imbalance  Amlodipine, Zofran, Xanax  Cardiac workup    Amount and/or Complexity of Data Reviewed  Labs: ordered.    Risk  Prescription drug management.                                      Clinical Impression:  Final diagnoses:  [I10] Hypertension  [R03.0] Elevated blood pressure reading (Primary)          ED Disposition Condition    Discharge Good          ED Prescriptions       Medication Sig Dispense Start Date End Date Auth. Provider    amLODIPine (NORVASC) 10 MG tablet Take 1 tablet (10 mg total) by mouth once daily. 90 tablet 12/24/2024 12/24/2025 Arian Yee MD          Follow-up Information       Follow up With Specialties Details Why Contact Info    Ravi Sanchez MD Pediatrics Call today  [address]  [place] [address]  [phone]               Arian Yee MD  12/24/24 0021

## 2024-12-26 LAB
OHS QRS DURATION: 74 MS
OHS QTC CALCULATION: 414 MS

## 2025-02-16 ENCOUNTER — HOSPITAL ENCOUNTER (EMERGENCY)
Facility: HOSPITAL | Age: OVER 89
Discharge: HOME | End: 2025-02-16
Attending: FAMILY MEDICINE
Payer: MEDICARE

## 2025-02-16 VITALS
RESPIRATION RATE: 18 BRPM | SYSTOLIC BLOOD PRESSURE: 168 MMHG | HEART RATE: 78 BPM | WEIGHT: 176 LBS | HEIGHT: 62 IN | DIASTOLIC BLOOD PRESSURE: 86 MMHG | TEMPERATURE: 98 F | OXYGEN SATURATION: 100 % | BODY MASS INDEX: 32.39 KG/M2

## 2025-02-16 DIAGNOSIS — I10 HYPERTENSION, UNSPECIFIED TYPE: Primary | ICD-10-CM

## 2025-02-16 PROCEDURE — 99283 EMERGENCY DEPT VISIT LOW MDM: CPT

## 2025-02-16 RX ORDER — HYDROCHLOROTHIAZIDE 25 MG/1
12.5 TABLET ORAL DAILY
Qty: 30 TABLET | Refills: 0 | Status: SHIPPED | OUTPATIENT
Start: 2025-02-16 | End: 2025-03-18

## 2025-02-16 NOTE — ED PROVIDER NOTES
Encounter Date: 2/16/2025       History       Chief Complaint  Patient presents with  · Hypertension    PT to ER with daughter, C/O high blood pressure after her BP machine told her it was high. PT states her blood pressure at home was 168/87. No symptoms, denies chest pain and denies headache.  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   HENT:  Negative for congestion, dental problem, drooling, ear discharge, ear pain, facial swelling, nosebleeds, postnasal drip and sore throat.    Eyes:  Negative for pain, discharge, redness and itching.   Respiratory:  Negative for apnea, choking, chest tightness and shortness of breath.    Cardiovascular:  Negative for chest pain.   Gastrointestinal:  Negative for abdominal distention, abdominal pain, anal bleeding, blood in stool and nausea.   Endocrine: Negative for cold intolerance, heat intolerance, polydipsia and polyphagia.   Genitourinary:  Negative for decreased urine volume,  dysuria, flank pain and menstrual problem.   Musculoskeletal:  Negative for back pain and gait problem.   Skin:  Negative for rash.   Neurological:  Negative for dizziness, seizures, facial asymmetry, weakness, light-headedness, numbness and headaches.   Hematological:  Does not bruise/bleed easily.   Psychiatric/Behavioral:  Negative for agitation, behavioral problems and confusion. The patient is not nervous/anxious.        Physical Exam     Initial Vitals [02/16/25 0906]   BP Pulse Resp Temp SpO2   (!) 156/84 82 18 98.2 °F (36.8 °C) 99 %      MAP       --         Physical Exam    Nursing note and vitals reviewed.  Constitutional: She appears well-developed.   HENT:   Head: Normocephalic and atraumatic.   Eyes: Pupils are equal, round, and reactive to light.   Neck:   Normal range of motion.  Cardiovascular:  Normal rate, regular rhythm, normal heart sounds and intact distal pulses.           Pulmonary/Chest: Breath sounds normal. No respiratory distress. She has no wheezes. She has no rhonchi. She has no rales.   Abdominal: Abdomen is soft. Bowel sounds are normal. She exhibits no distension. There is no abdominal tenderness. There is no rebound and no guarding.   Musculoskeletal:         General: Normal range of motion.      Cervical back: Normal range of motion.     Neurological: She is alert and oriented to person, place, and time.   Skin: Skin is warm.   Psychiatric: She has a normal mood and affect.         ED Course   Procedures  Labs Reviewed - No data to display       Imaging Results    None          Medications - No data to display  Medical Decision Making                                    Clinical Impression:  Final diagnoses:  [I10] Hypertension, unspecified type (Primary)          ED Disposition Condition    Discharge Stable          ED Prescriptions       Medication Sig Dispense Start Date End Date Auth. Provider    hydroCHLOROthiazide (HYDRODIURIL) 25 MG tablet Take 0.5 tablets (12.5 mg total) by  mouth once daily. 30 tablet 2/16/2025 3/18/2025 Farhad Goyal MD          Follow-up Information       Follow up With Specialties Details Why Contact Info    Ravi Sanchez MD Pediatrics Call in 1 day  [address] [place] [address]  [phone]                 [1]   Social History  Tobacco Use    Smoking status: Never    Smokeless tobacco: Never   Substance Use Topics    Alcohol use: Not Currently    Drug use: Never        Farhad Goyal MD  02/16/25 0918

## (undated) DEVICE — GLOVE PROTEXIS HYDROGEL SZ6.5

## (undated) DEVICE — KIT SURGICAL COLON .25 1.1OZ

## (undated) DEVICE — SNARE POLYP STD SNG 7FR

## (undated) DEVICE — TRAP SUCTION POLYP

## (undated) DEVICE — PAD ELECTROSURGICAL SPL W/CORD